# Patient Record
Sex: MALE | URBAN - METROPOLITAN AREA
[De-identification: names, ages, dates, MRNs, and addresses within clinical notes are randomized per-mention and may not be internally consistent; named-entity substitution may affect disease eponyms.]

---

## 2023-03-23 ENCOUNTER — INPATIENT (INPATIENT)
Facility: HOSPITAL | Age: 88
LOS: 7 days | Discharge: NURSING, REHAB OR LONG-TERM CARE | DRG: 56 | End: 2023-03-31
Attending: STUDENT IN AN ORGANIZED HEALTH CARE EDUCATION/TRAINING PROGRAM | Admitting: HOSPITALIST
Payer: MEDICARE

## 2023-03-23 VITALS
SYSTOLIC BLOOD PRESSURE: 115 MMHG | TEMPERATURE: 98 F | RESPIRATION RATE: 16 BRPM | WEIGHT: 169.98 LBS | HEART RATE: 62 BPM | OXYGEN SATURATION: 97 % | HEIGHT: 71 IN | DIASTOLIC BLOOD PRESSURE: 72 MMHG

## 2023-03-23 DIAGNOSIS — R62.7 ADULT FAILURE TO THRIVE: ICD-10-CM

## 2023-03-23 LAB
ALBUMIN SERPL ELPH-MCNC: 3.2 G/DL — LOW (ref 3.3–5)
ALP SERPL-CCNC: 271 U/L — HIGH (ref 40–120)
ALT FLD-CCNC: 7 U/L — LOW (ref 10–45)
AST SERPL-CCNC: 24 U/L — SIGNIFICANT CHANGE UP (ref 10–40)
BASE EXCESS BLDV CALC-SCNC: 15.7 MMOL/L — HIGH (ref -2–3)
BASOPHILS # BLD AUTO: 0.02 K/UL — SIGNIFICANT CHANGE UP (ref 0–0.2)
BASOPHILS NFR BLD AUTO: 0.3 % — SIGNIFICANT CHANGE UP (ref 0–2)
BILIRUB SERPL-MCNC: 1 MG/DL — SIGNIFICANT CHANGE UP (ref 0.2–1.2)
BUN SERPL-MCNC: 13 MG/DL — SIGNIFICANT CHANGE UP (ref 7–23)
CA-I SERPL-SCNC: 1.09 MMOL/L — LOW (ref 1.15–1.33)
CALCIUM SERPL-MCNC: 8.8 MG/DL — SIGNIFICANT CHANGE UP (ref 8.4–10.5)
CHLORIDE BLDV-SCNC: 99 MMOL/L — SIGNIFICANT CHANGE UP (ref 96–108)
CHLORIDE SERPL-SCNC: 96 MMOL/L — SIGNIFICANT CHANGE UP (ref 96–108)
CO2 BLDV-SCNC: 45 MMOL/L — HIGH (ref 22–26)
CO2 SERPL-SCNC: 32 MMOL/L — HIGH (ref 22–31)
CREAT SERPL-MCNC: 1.07 MG/DL — SIGNIFICANT CHANGE UP (ref 0.5–1.3)
EGFR: 67 ML/MIN/1.73M2 — SIGNIFICANT CHANGE UP
EOSINOPHIL # BLD AUTO: 0.02 K/UL — SIGNIFICANT CHANGE UP (ref 0–0.5)
EOSINOPHIL NFR BLD AUTO: 0.3 % — SIGNIFICANT CHANGE UP (ref 0–6)
FLUAV AG NPH QL: SIGNIFICANT CHANGE UP
FLUBV AG NPH QL: SIGNIFICANT CHANGE UP
GAS PNL BLDV: 140 MMOL/L — SIGNIFICANT CHANGE UP (ref 136–145)
GAS PNL BLDV: SIGNIFICANT CHANGE UP
GAS PNL BLDV: SIGNIFICANT CHANGE UP
GLUCOSE BLDV-MCNC: 123 MG/DL — HIGH (ref 70–99)
GLUCOSE SERPL-MCNC: 132 MG/DL — HIGH (ref 70–99)
HCO3 BLDV-SCNC: 43 MMOL/L — HIGH (ref 22–29)
HCT VFR BLD CALC: 38 % — LOW (ref 39–50)
HCT VFR BLDA CALC: 38 % — LOW (ref 39–51)
HGB BLD CALC-MCNC: 12.6 G/DL — SIGNIFICANT CHANGE UP (ref 12.6–17.4)
HGB BLD-MCNC: 12.9 G/DL — LOW (ref 13–17)
IMM GRANULOCYTES NFR BLD AUTO: 0.5 % — SIGNIFICANT CHANGE UP (ref 0–0.9)
LACTATE BLDV-MCNC: 1.7 MMOL/L — SIGNIFICANT CHANGE UP (ref 0.5–2)
LIDOCAIN IGE QN: 12 U/L — SIGNIFICANT CHANGE UP (ref 7–60)
LYMPHOCYTES # BLD AUTO: 1.21 K/UL — SIGNIFICANT CHANGE UP (ref 1–3.3)
LYMPHOCYTES # BLD AUTO: 18.7 % — SIGNIFICANT CHANGE UP (ref 13–44)
MAGNESIUM SERPL-MCNC: 2.4 MG/DL — SIGNIFICANT CHANGE UP (ref 1.6–2.6)
MCHC RBC-ENTMCNC: 31.5 PG — SIGNIFICANT CHANGE UP (ref 27–34)
MCHC RBC-ENTMCNC: 33.9 GM/DL — SIGNIFICANT CHANGE UP (ref 32–36)
MCV RBC AUTO: 92.7 FL — SIGNIFICANT CHANGE UP (ref 80–100)
MONOCYTES # BLD AUTO: 0.36 K/UL — SIGNIFICANT CHANGE UP (ref 0–0.9)
MONOCYTES NFR BLD AUTO: 5.6 % — SIGNIFICANT CHANGE UP (ref 2–14)
NEUTROPHILS # BLD AUTO: 4.82 K/UL — SIGNIFICANT CHANGE UP (ref 1.8–7.4)
NEUTROPHILS NFR BLD AUTO: 74.6 % — SIGNIFICANT CHANGE UP (ref 43–77)
NRBC # BLD: 0 /100 WBCS — SIGNIFICANT CHANGE UP (ref 0–0)
PCO2 BLDV: 63 MMHG — HIGH (ref 42–55)
PH BLDV: 7.44 — HIGH (ref 7.32–7.43)
PHOSPHATE SERPL-MCNC: 2.8 MG/DL — SIGNIFICANT CHANGE UP (ref 2.5–4.5)
PLATELET # BLD AUTO: 255 K/UL — SIGNIFICANT CHANGE UP (ref 150–400)
PO2 BLDV: 18 MMHG — LOW (ref 25–45)
POTASSIUM BLDV-SCNC: 2.8 MMOL/L — CRITICAL LOW (ref 3.5–5.1)
POTASSIUM SERPL-MCNC: 4.3 MMOL/L — SIGNIFICANT CHANGE UP (ref 3.5–5.3)
POTASSIUM SERPL-SCNC: 4.3 MMOL/L — SIGNIFICANT CHANGE UP (ref 3.5–5.3)
PROT SERPL-MCNC: 7.6 G/DL — SIGNIFICANT CHANGE UP (ref 6–8.3)
RBC # BLD: 4.1 M/UL — LOW (ref 4.2–5.8)
RBC # FLD: 13 % — SIGNIFICANT CHANGE UP (ref 10.3–14.5)
RSV RNA NPH QL NAA+NON-PROBE: SIGNIFICANT CHANGE UP
SAO2 % BLDV: 27.4 % — LOW (ref 67–88)
SARS-COV-2 RNA SPEC QL NAA+PROBE: SIGNIFICANT CHANGE UP
SODIUM SERPL-SCNC: 141 MMOL/L — SIGNIFICANT CHANGE UP (ref 135–145)
WBC # BLD: 6.46 K/UL — SIGNIFICANT CHANGE UP (ref 3.8–10.5)
WBC # FLD AUTO: 6.46 K/UL — SIGNIFICANT CHANGE UP (ref 3.8–10.5)

## 2023-03-23 PROCEDURE — 72125 CT NECK SPINE W/O DYE: CPT | Mod: 26,MA

## 2023-03-23 PROCEDURE — 71045 X-RAY EXAM CHEST 1 VIEW: CPT | Mod: 26

## 2023-03-23 PROCEDURE — 70450 CT HEAD/BRAIN W/O DYE: CPT | Mod: 26,MA

## 2023-03-23 PROCEDURE — 99285 EMERGENCY DEPT VISIT HI MDM: CPT

## 2023-03-23 RX ORDER — SODIUM CHLORIDE 9 MG/ML
1000 INJECTION, SOLUTION INTRAVENOUS ONCE
Refills: 0 | Status: COMPLETED | OUTPATIENT
Start: 2023-03-23 | End: 2023-03-23

## 2023-03-23 RX ADMIN — SODIUM CHLORIDE 1000 MILLILITER(S): 9 INJECTION, SOLUTION INTRAVENOUS at 19:47

## 2023-03-23 NOTE — ED PROCEDURE NOTE - ATTENDING CONTRIBUTION TO CARE
I, EM Attending, Cheikh Colón was present for and supervised the critical portions of the procedure performed by the Resident Physician or SLOANE.

## 2023-03-23 NOTE — ED PROVIDER NOTE - ATTENDING CONTRIBUTION TO CARE
Cheikh Colón MD:  I personally saw the patient and performed a substantive portion of the visit including all aspects of the medical decision making.    MDM: 87-year-old male who presents with generalized weakness, failure to thrive, decreased p.o. for the last 2 weeks.  Associated with weight loss, and fluctuating BPs that have often gone below.  Of note patient had a fall 1 month ago, but denies any symptoms from this.    On examination, patient with stable vitals mildly cachectic, (+) dry mucous membranes. Cardiac examination RRR, lungs CTAB, abdomen soft and nontender, neurovascularly intact in all 4 extremities.    Will obtain labs to evaluate for hematologic disorder, metabolic derangements, hepatic and renal function, and screen for infection.  Will obtain CT Head to evaluate for acute intracranial pathology.  Will obtain CT C-spine to evaluate for acute traumatic cervical spine injury.  Will obtain chest x-ray to evaluate for acute cardiopulmonary pathology.  Will give IV fluids.  Since patient has been having difficulty ambulation, and failure to thrive and dry mucosal membranes and inability to tolerate p.o., he will likely require admission.    My independent interpretation of the EKG shows:  Sinus bradycardia with rate of 54 bpm, , , prolonged QTc of 629, no ST elevations, (+) for mild ST depressions in multiple leads including inferior lateral.  Will obtain troponin to evaluate for ACS, but pt asymptomatic from this standpoint.     Differential includes but is not limited to: See above    Patient with new problems requiring additional work-up and treatment, following orders: see above  Discussed case with: Admitting physician  Obtained and reviewed external records: N/A  Additional history obtained from: Daughter at bedside  Chronic conditions and social determinants of health affecting care: See above  Consideration of admission

## 2023-03-23 NOTE — ED PROVIDER NOTE - RAPID ASSESSMENT
Dr. Iniguez Note: h/o from daughter, pt with weight loss, low BPs at home, not eating, falling at home, has head and neck pain, no fever, respiratory sxs.  Patient was rapidly assessed via a telemedicine and/or role of Quick Triage Doctor; a limited history, physical exam and assessment was performed. The patient will be seen and further evaluated in the main emergency department. The remainder of care and evaluation will be conducted by the primary emergency medicine team. Receiving team will follow up on labs, imaging and serially reassess patient as indicated. All further decisions regarding patient care, evaluation and disposition are at the discretion of the receiving primary emergency department team.

## 2023-03-23 NOTE — ED PROVIDER NOTE - OBJECTIVE STATEMENT
87-year-old male, no past medical history aside from difficulty hearing, presenting with generalized weakness ongoing for 2 weeks.  Failure to thrive ongoing for the past 4 weeks, with minimal p.o. intake.  No other active symptoms per daughter.  ongoing weakness for the past 2 weeks, progressing to the point that he has difficulty completing his daily functional task.  No allergies to meds.  No regular medications.

## 2023-03-23 NOTE — ED ADULT TRIAGE NOTE - CHIEF COMPLAINT QUOTE
fluctuating BP and weakness x 1 week. denies HA, vision changes, fever, chills. lives at home with daughter. reports falls at home.

## 2023-03-23 NOTE — ED PROVIDER NOTE - NS ED ROS FT
GENERAL: + FT, no fever  EYES: no eye pain  HEENT: no neck pain  CARDIAC: no chest pain  PULMONARY: no SOB  GI: no abdominal pain  : no dysuria  SKIN: no rashes  NEURO: no headache  MSK: no new joint pain

## 2023-03-23 NOTE — ED PROVIDER NOTE - PROGRESS NOTE DETAILS
Marcela López, PGY1, MD: pt signed out to me pending CT head and admission for FTT, CT head neg for ICH, discussed plan and findings with patient and family at bedside, all questions answered, they agree with admission. Cheikh Colón MD: labs and imaging reviewed. The patient will need to be admitted to the hospital for continued evaluation and management.  Discussed with the accepting physician regarding the initial presentation, diagnostic studies, treatments given in the ED, and current plan of care.   The patient was accepted by and endorsed to the medicine team.

## 2023-03-23 NOTE — ED ADULT NURSE NOTE - OBJECTIVE STATEMENT
86 yo male [place] no PMH, A&Ox3, presents to ED c/o generalized weakness.  Pts daughter reports over last month he has had decreased PO intake and weight loss , after fall and being more weak in last 2 weeks.   Pt denies any complaints.   Pt lives with daughter.  Breathing even and unlabored, abdomen soft nontender, no pedal edema. Pt denies chest pain, palpitations, shortness of breath, headache, visual disturbances, numbness/tingling, fever, chills, diaphoresis,  nausea, vomiting, constipation, diarrhea, or urinary symptoms.

## 2023-03-23 NOTE — ED PROVIDER NOTE - CLINICAL SUMMARY MEDICAL DECISION MAKING FREE TEXT BOX
Elderly male, presenting with failure to thrive.  Vital signs unremarkable.  Exam nonfocal.  Work-up for generalized weakness and failure to thrive.  Rule out metabolic derangements, rule out occult infections.  Head CT to assess for evidence subdural hematoma.   no active symptoms to suggest acute cardiac injury in a patient with chronic failure to thrive.  Will obtain CMP, CBC, VBG, head CT and cervical spine CT.  Likely admission for failure to thrive in elderly male.

## 2023-03-24 DIAGNOSIS — R62.7 ADULT FAILURE TO THRIVE: ICD-10-CM

## 2023-03-24 DIAGNOSIS — E87.6 HYPOKALEMIA: ICD-10-CM

## 2023-03-24 DIAGNOSIS — Z02.9 ENCOUNTER FOR ADMINISTRATIVE EXAMINATIONS, UNSPECIFIED: ICD-10-CM

## 2023-03-24 LAB
A1C WITH ESTIMATED AVERAGE GLUCOSE RESULT: 5.6 % — SIGNIFICANT CHANGE UP (ref 4–5.6)
ALBUMIN SERPL ELPH-MCNC: 3.2 G/DL — LOW (ref 3.3–5)
ALP SERPL-CCNC: 258 U/L — HIGH (ref 40–120)
ALT FLD-CCNC: 6 U/L — LOW (ref 10–45)
ANION GAP SERPL CALC-SCNC: 10 MMOL/L — SIGNIFICANT CHANGE UP (ref 5–17)
APTT BLD: 33.3 SEC — SIGNIFICANT CHANGE UP (ref 27.5–35.5)
AST SERPL-CCNC: 9 U/L — LOW (ref 10–40)
BASOPHILS # BLD AUTO: 0.02 K/UL — SIGNIFICANT CHANGE UP (ref 0–0.2)
BASOPHILS NFR BLD AUTO: 0.4 % — SIGNIFICANT CHANGE UP (ref 0–2)
BILIRUB SERPL-MCNC: 1 MG/DL — SIGNIFICANT CHANGE UP (ref 0.2–1.2)
BUN SERPL-MCNC: 13 MG/DL — SIGNIFICANT CHANGE UP (ref 7–23)
CALCIUM SERPL-MCNC: 8.2 MG/DL — LOW (ref 8.4–10.5)
CHLORIDE SERPL-SCNC: 98 MMOL/L — SIGNIFICANT CHANGE UP (ref 96–108)
CHOLEST SERPL-MCNC: 119 MG/DL — SIGNIFICANT CHANGE UP
CO2 SERPL-SCNC: 35 MMOL/L — HIGH (ref 22–31)
CREAT SERPL-MCNC: 1.06 MG/DL — SIGNIFICANT CHANGE UP (ref 0.5–1.3)
CRP SERPL-MCNC: 29 MG/L — HIGH (ref 0–4)
EGFR: 68 ML/MIN/1.73M2 — SIGNIFICANT CHANGE UP
EOSINOPHIL # BLD AUTO: 0.05 K/UL — SIGNIFICANT CHANGE UP (ref 0–0.5)
EOSINOPHIL NFR BLD AUTO: 0.9 % — SIGNIFICANT CHANGE UP (ref 0–6)
ERYTHROCYTE [SEDIMENTATION RATE] IN BLOOD: 71 MM/HR — HIGH (ref 0–20)
ESTIMATED AVERAGE GLUCOSE: 114 MG/DL — SIGNIFICANT CHANGE UP (ref 68–114)
FOLATE SERPL-MCNC: 3.1 NG/ML — LOW
GLUCOSE SERPL-MCNC: 96 MG/DL — SIGNIFICANT CHANGE UP (ref 70–99)
HCT VFR BLD CALC: 34.8 % — LOW (ref 39–50)
HDLC SERPL-MCNC: 31 MG/DL — LOW
HGB BLD-MCNC: 11.7 G/DL — LOW (ref 13–17)
IMM GRANULOCYTES NFR BLD AUTO: 0.5 % — SIGNIFICANT CHANGE UP (ref 0–0.9)
INR BLD: 1.17 RATIO — HIGH (ref 0.88–1.16)
LIPID PNL WITH DIRECT LDL SERPL: 75 MG/DL — SIGNIFICANT CHANGE UP
LYMPHOCYTES # BLD AUTO: 0.95 K/UL — LOW (ref 1–3.3)
LYMPHOCYTES # BLD AUTO: 16.6 % — SIGNIFICANT CHANGE UP (ref 13–44)
MAGNESIUM SERPL-MCNC: 2.2 MG/DL — SIGNIFICANT CHANGE UP (ref 1.6–2.6)
MCHC RBC-ENTMCNC: 30.9 PG — SIGNIFICANT CHANGE UP (ref 27–34)
MCHC RBC-ENTMCNC: 33.6 GM/DL — SIGNIFICANT CHANGE UP (ref 32–36)
MCV RBC AUTO: 91.8 FL — SIGNIFICANT CHANGE UP (ref 80–100)
MONOCYTES # BLD AUTO: 0.46 K/UL — SIGNIFICANT CHANGE UP (ref 0–0.9)
MONOCYTES NFR BLD AUTO: 8.1 % — SIGNIFICANT CHANGE UP (ref 2–14)
NEUTROPHILS # BLD AUTO: 4.2 K/UL — SIGNIFICANT CHANGE UP (ref 1.8–7.4)
NEUTROPHILS NFR BLD AUTO: 73.5 % — SIGNIFICANT CHANGE UP (ref 43–77)
NON HDL CHOLESTEROL: 88 MG/DL — SIGNIFICANT CHANGE UP
NRBC # BLD: 0 /100 WBCS — SIGNIFICANT CHANGE UP (ref 0–0)
PHOSPHATE SERPL-MCNC: 2.3 MG/DL — LOW (ref 2.5–4.5)
PLATELET # BLD AUTO: 190 K/UL — SIGNIFICANT CHANGE UP (ref 150–400)
POTASSIUM SERPL-MCNC: 2.3 MMOL/L — CRITICAL LOW (ref 3.5–5.3)
POTASSIUM SERPL-SCNC: 2.3 MMOL/L — CRITICAL LOW (ref 3.5–5.3)
PROT SERPL-MCNC: 6.4 G/DL — SIGNIFICANT CHANGE UP (ref 6–8.3)
PROTHROM AB SERPL-ACNC: 13.6 SEC — HIGH (ref 10.5–13.4)
RBC # BLD: 3.79 M/UL — LOW (ref 4.2–5.8)
RBC # FLD: 12.9 % — SIGNIFICANT CHANGE UP (ref 10.3–14.5)
SODIUM SERPL-SCNC: 143 MMOL/L — SIGNIFICANT CHANGE UP (ref 135–145)
T PALLIDUM AB TITR SER: NEGATIVE — SIGNIFICANT CHANGE UP
TRIGL SERPL-MCNC: 62 MG/DL — SIGNIFICANT CHANGE UP
TROPONIN T, HIGH SENSITIVITY RESULT: 36 NG/L — SIGNIFICANT CHANGE UP (ref 0–51)
VIT B12 SERPL-MCNC: 221 PG/ML — LOW (ref 232–1245)
WBC # BLD: 5.71 K/UL — SIGNIFICANT CHANGE UP (ref 3.8–10.5)
WBC # FLD AUTO: 5.71 K/UL — SIGNIFICANT CHANGE UP (ref 3.8–10.5)

## 2023-03-24 PROCEDURE — 78227 HEPATOBIL SYST IMAGE W/DRUG: CPT | Mod: 26

## 2023-03-24 PROCEDURE — 12345: CPT | Mod: NC

## 2023-03-24 PROCEDURE — 93306 TTE W/DOPPLER COMPLETE: CPT | Mod: 26

## 2023-03-24 PROCEDURE — 74177 CT ABD & PELVIS W/CONTRAST: CPT | Mod: 26

## 2023-03-24 PROCEDURE — 76705 ECHO EXAM OF ABDOMEN: CPT | Mod: 26

## 2023-03-24 PROCEDURE — 71260 CT THORAX DX C+: CPT | Mod: 26

## 2023-03-24 PROCEDURE — 99223 1ST HOSP IP/OBS HIGH 75: CPT

## 2023-03-24 RX ORDER — PREGABALIN 225 MG/1
1000 CAPSULE ORAL DAILY
Refills: 0 | Status: COMPLETED | OUTPATIENT
Start: 2023-03-24 | End: 2023-03-31

## 2023-03-24 RX ORDER — POTASSIUM CHLORIDE 20 MEQ
40 PACKET (EA) ORAL EVERY 4 HOURS
Refills: 0 | Status: COMPLETED | OUTPATIENT
Start: 2023-03-24 | End: 2023-03-24

## 2023-03-24 RX ORDER — HEPARIN SODIUM 5000 [USP'U]/ML
5000 INJECTION INTRAVENOUS; SUBCUTANEOUS EVERY 8 HOURS
Refills: 0 | Status: DISCONTINUED | OUTPATIENT
Start: 2023-03-24 | End: 2023-03-31

## 2023-03-24 RX ORDER — ONDANSETRON 8 MG/1
4 TABLET, FILM COATED ORAL EVERY 8 HOURS
Refills: 0 | Status: DISCONTINUED | OUTPATIENT
Start: 2023-03-24 | End: 2023-03-31

## 2023-03-24 RX ORDER — POTASSIUM CHLORIDE 20 MEQ
10 PACKET (EA) ORAL
Refills: 0 | Status: COMPLETED | OUTPATIENT
Start: 2023-03-24 | End: 2023-03-24

## 2023-03-24 RX ORDER — ACETAMINOPHEN 500 MG
650 TABLET ORAL EVERY 6 HOURS
Refills: 0 | Status: DISCONTINUED | OUTPATIENT
Start: 2023-03-24 | End: 2023-03-31

## 2023-03-24 RX ORDER — LANOLIN ALCOHOL/MO/W.PET/CERES
3 CREAM (GRAM) TOPICAL AT BEDTIME
Refills: 0 | Status: DISCONTINUED | OUTPATIENT
Start: 2023-03-24 | End: 2023-03-31

## 2023-03-24 RX ORDER — SODIUM CHLORIDE 9 MG/ML
1000 INJECTION INTRAMUSCULAR; INTRAVENOUS; SUBCUTANEOUS
Refills: 0 | Status: DISCONTINUED | OUTPATIENT
Start: 2023-03-24 | End: 2023-03-26

## 2023-03-24 RX ORDER — FOLIC ACID 0.8 MG
1 TABLET ORAL DAILY
Refills: 0 | Status: DISCONTINUED | OUTPATIENT
Start: 2023-03-24 | End: 2023-03-31

## 2023-03-24 RX ADMIN — Medication 40 MILLIEQUIVALENT(S): at 10:41

## 2023-03-24 RX ADMIN — Medication 100 MILLIEQUIVALENT(S): at 13:19

## 2023-03-24 RX ADMIN — Medication 63.75 MILLIMOLE(S): at 10:41

## 2023-03-24 RX ADMIN — HEPARIN SODIUM 5000 UNIT(S): 5000 INJECTION INTRAVENOUS; SUBCUTANEOUS at 05:08

## 2023-03-24 RX ADMIN — HEPARIN SODIUM 5000 UNIT(S): 5000 INJECTION INTRAVENOUS; SUBCUTANEOUS at 13:20

## 2023-03-24 RX ADMIN — Medication 100 MILLIEQUIVALENT(S): at 11:29

## 2023-03-24 RX ADMIN — SODIUM CHLORIDE 75 MILLILITER(S): 9 INJECTION INTRAMUSCULAR; INTRAVENOUS; SUBCUTANEOUS at 10:41

## 2023-03-24 RX ADMIN — Medication 40 MILLIEQUIVALENT(S): at 13:19

## 2023-03-24 RX ADMIN — Medication 40 MILLIEQUIVALENT(S): at 17:53

## 2023-03-24 RX ADMIN — Medication 100 MILLIEQUIVALENT(S): at 10:40

## 2023-03-24 RX ADMIN — SODIUM CHLORIDE 75 MILLILITER(S): 9 INJECTION INTRAMUSCULAR; INTRAVENOUS; SUBCUTANEOUS at 13:20

## 2023-03-24 RX ADMIN — HEPARIN SODIUM 5000 UNIT(S): 5000 INJECTION INTRAVENOUS; SUBCUTANEOUS at 22:00

## 2023-03-24 RX ADMIN — SODIUM CHLORIDE 75 MILLILITER(S): 9 INJECTION INTRAMUSCULAR; INTRAVENOUS; SUBCUTANEOUS at 05:08

## 2023-03-24 NOTE — PROGRESS NOTE ADULT - PROBLEM SELECTOR PLAN 4
CTAP w/ acute cholecystitis on imaging, however pt w/o symptoms, neg kirby's sign  - surgery eval  - HIDA scan ordered

## 2023-03-24 NOTE — OCCUPATIONAL THERAPY INITIAL EVALUATION ADULT - PERTINENT HX OF CURRENT PROBLEM, REHAB EVAL
86 yo m w no pmh, p/w generalized fatigue/weakness/malaise for the last month. patient has been having poor po intake with weight loss during this time. now so weak, that he feels as though he is unable to perform adls; in addition, he has incurred multiple falls over this time period as well. family and patient grew concerned so present to [facility] er for further evaluation.   CT HEAD: BRAIN 3/23/23:No acute hemorrhage, mass or midline shift.  CERVICAL SPINE3/23/23 :No fracture or traumatic subluxation. Chronic degenerative changes as   above. Consider MRI as clinically warranted.  XRAY CHEST: Clear lungs. 86 yo m w no pmh, p/w generalized fatigue/weakness/malaise for the last month. patient has been having poor po intake with weight loss during this time. now so weak, that he feels as though he is unable to perform adls; in addition, he has incurred multiple falls over this time period as well. family and patient grew concerned so present to [facility] er for further evaluation.   CT HEAD: BRAIN 3/23/23:No acute hemorrhage, mass or midline shift.  CERVICAL SPINE3/23/23 :No fracture or traumatic subluxation. Chronic degenerative changes as   above. Consider MRI as clinically warranted.  CT Chest 3/24/2023: No evidence of malignancy. Distended gallbladder with irregular wall thickening and pericholecystic   inflammation consistent with acute cholecystitis.      XRAY CHEST: Clear lungs.

## 2023-03-24 NOTE — PHYSICAL THERAPY INITIAL EVALUATION ADULT - PERTINENT HX OF CURRENT PROBLEM, REHAB EVAL
86 y/o M with no PMH now p/w generalized fatigue/weakness/malaise for the last month. patient has been having poor po intake with weight loss during this time. now so weak, that he feels as though he is unable to perform adls; in addition, he has incurred multiple falls over this time period as well. family and patient grew concerned so present to [facility] ED for further evaluation. CHEST XRAY: clear. CT HEAD (-). CT CERVICAL SPINE: no acute findings. CT CHEST/ABDOMEN/PELVIS: no evidence of malignancy. Distended gallbladder with irregular wall thickening and pericholecystic inflammation consistent with acute cholecystitis.

## 2023-03-24 NOTE — H&P ADULT - NSHPPHYSICALEXAM_GEN_ALL_CORE
T(C): 37 (03-24-23 @ 00:14), Max: 37.1 (03-23-23 @ 23:48)  HR: 62 (03-24-23 @ 00:14) (55 - 62)  BP: 130/68 (03-24-23 @ 00:14) (115/72 - 149/77)  RR: 18 (03-24-23 @ 00:14) (16 - 18)  SpO2: 95% (03-24-23 @ 00:14) (94% - 98%)  GENERAL: NAD, lying in bed comfortably  EYES: EOMI, PERRLA; conjunctiva and sclera clear  ENMT: Moist oral mucosa, no pharyngeal injection or exudates  NECK: Supple, no palpable masses; no JVD  RESPIRATORY: Normal respiratory effort; lungs are clear to auscultation bilaterally  CARDIOVASCULAR: Regular rate and rhythm, normal S1 and S2, no murmur/rub/gallop; No lower extremity edema; Peripheral pulses are 2+ bilaterally  ABDOMEN: Nontender to palpation, normoactive bowel sounds, no rebound/guarding   MUSCULOSKELETAL:   no joint swelling or tenderness to palpation  PSYCH: A+O to person, place, and time; affect appropriate  NEUROLOGY: CN 2-12 are intact and symmetric; no gross motor or sensory deficits   SKIN: No rashes; no palpable lesions

## 2023-03-24 NOTE — H&P ADULT - PROBLEM SELECTOR PLAN 1
neuroimaging shows chronic microangiopathic ischemic changes  Monitor mental status with frequent neurochecks  follow up inflammatory markers, tfts, urine studies, b12+folate+rpr, pan imaging  maintain fall, delirium, seizure, aspiration precautions; keep head end of bed elevated  nutrition consult in am  pt/ot eval + sw/cm consult for disposition

## 2023-03-24 NOTE — H&P ADULT - HISTORY OF PRESENT ILLNESS
86 yo m w no pmh, p/w generalized fatigue/weakness/malaise for the last month. patient has been having poor po intake with weight loss during this time. now so weak, that he feels as though he is unable to perform adls; in addition, he has incurred multiple falls over this time period as well. family and patient grew concerned so present to [facility] er for further evaluation.

## 2023-03-24 NOTE — PATIENT PROFILE ADULT - FALL HARM RISK - HARM RISK INTERVENTIONS

## 2023-03-24 NOTE — PROGRESS NOTE ADULT - PROBLEM SELECTOR PLAN 1
CTH shows chronic microangiopathic ischemic changes  vitamin b12 and folate deficiency noted on labs  pan scan - CT c/a/p neg for gross malignancy  - vit b12 1000 mcg sc qd while inpt for faster absorption, can switch to po in dc. Folate 1mg qd started  - r/o pernicious anemia - check intrinsic factor ab  - nutrition consult  - regular diet - added ensure tid CTH shows chronic microangiopathic ischemic changes  vitamin b12 and folate deficiency noted on labs  pan scan - CT c/a/p neg for gross malignancy  no known hx of diarrhea/blood loss  - vit b12 1000 mcg sc qd while inpt for faster absorption, can switch to po in dc. Folate 1mg qd started  - r/o pernicious anemia - check intrinsic factor ab  - nutrition consult  - regular diet - added ensure tid

## 2023-03-24 NOTE — H&P ADULT - NSHPREVIEWOFSYSTEMS_GEN_ALL_CORE
CONSTITUTIONAL: No fever. +weakness/fatigue/malaise, poor po intake, weight loss  ENMT:  No sinus or throat pain  RESPIRATORY: No cough, wheezing, chills or hemoptysis; No shortness of breath  CARDIOVASCULAR: No chest pain, palpitations, dizziness, or leg swelling  GASTROINTESTINAL: No abdominal or epigastric pain. No nausea, vomiting, or hematemesis; No diarrhea or constipation. No melena or hematochezia.  GENITOURINARY: No dysuria or incontinence  NEUROLOGICAL: No headaches, memory loss, loss of strength, numbness, or tremors  SKIN: No rashes,  No hives or eczema  ENDOCRINE: No heat or cold intolerance; No hair loss  MUSCULOSKELETAL: No joint pain or swelling; No muscle, back, or extremity pain  PSYCHIATRIC: No depression, anxiety, mood swings, or difficulty sleeping  HEME/LYMPH: No easy bruising, or bleeding gums

## 2023-03-24 NOTE — OCCUPATIONAL THERAPY INITIAL EVALUATION ADULT - DIAGNOSIS, OT EVAL
pt presents with decreased strength, hearing, postural control, and balance limiting their ability to engage in ADLs and functional tasks.

## 2023-03-24 NOTE — H&P ADULT - ASSESSMENT
86 yo m w no pmh, p/w generalized fatigue/weakness of unclear etiology, c/f ftt, admitted to medicine for further mgmt

## 2023-03-24 NOTE — OCCUPATIONAL THERAPY INITIAL EVALUATION ADULT - NS ASR FOLLOW COMMAND OT EVAL
pt very [place], requiring commands to be written down on paper./100% of the time/able to follow single-step instructions

## 2023-03-24 NOTE — PROGRESS NOTE ADULT - SUBJECTIVE AND OBJECTIVE BOX
Sarah Franklin MD  Division of Hospital Medicine  Available via MS Teams  If no response/off hours [phone]    Patient is a 87y old  Male who presents with a chief complaint of generalized fatigue/weakness (24 Mar 2023 03:02)        SUBJECTIVE / OVERNIGHT EVENTS:  hard of hearing, but denies complaints, no n/v/f/chills, cp, sob      I&O's Summary    23 Mar 2023 07:01  -  24 Mar 2023 07:00  --------------------------------------------------------  IN: 0 mL / OUT: 150 mL / NET: -150 mL      Vital Signs Last 24 Hrs  T(C): 36.9 (24 Mar 2023 11:27), Max: 37.1 (23 Mar 2023 23:48)  T(F): 98.5 (24 Mar 2023 11:27), Max: 98.7 (23 Mar 2023 23:48)  HR: 50 (24 Mar 2023 11:27) (50 - 62)  BP: 119/65 (24 Mar 2023 11:27) (119/65 - 149/77)  BP(mean): 89 (23 Mar 2023 23:48) (89 - 89)  RR: 18 (24 Mar 2023 11:27) (16 - 18)  SpO2: 95% (24 Mar 2023 11:27) (94% - 98%)    Parameters below as of 24 Mar 2023 11:27  Patient On (Oxygen Delivery Method): room air        PHYSICAL EXAM:  GENERAL:  Well appearing, tall m in NAD, hard of hearing  HEAD:  NCAT  EYES: conjunctiva clear  NECK: Supple, No JVD  CHEST/LUNG: CTA B/L. No w/r/r.  HEART: Reg rate. Normal S1, S2. No m/r/g.   ABDOMEN: Soft, no RUQ ttp, negative kirby's  EXTREMITIES:  2+ Peripheral Pulses, No clubbing, cyanosis  PSYCH: Appropriate affect  SKIN: No rashes or lesions    LABS:                        11.7   5.71  )-----------( 190      ( 24 Mar 2023 07:02 )             34.8     03-24    143  |  98  |  13  ----------------------------<  96  2.3<LL>   |  35<H>  |  1.06    Ca    8.2<L>      24 Mar 2023 07:02  Phos  2.3     03-24  Mg     2.2     03-24    TPro  6.4  /  Alb  3.2<L>  /  TBili  1.0  /  DBili  x   /  AST  9<L>  /  ALT  6<L>  /  AlkPhos  258<H>  03-24    PT/INR - ( 24 Mar 2023 07:02 )   PT: 13.6 sec;   INR: 1.17 ratio         PTT - ( 24 Mar 2023 07:02 )  PTT:33.3 sec              CAPILLARY BLOOD GLUCOSE        MEDICATIONS  (STANDING):  cyanocobalamin Injectable 1000 MICROGram(s) SubCutaneous daily  folic acid 1 milliGRAM(s) Oral daily  heparin   Injectable 5000 Unit(s) SubCutaneous every 8 hours  potassium chloride    Tablet ER 40 milliEquivalent(s) Oral every 4 hours  potassium chloride  10 mEq/100 mL IVPB 10 milliEquivalent(s) IV Intermittent every 1 hour  sodium chloride 0.9%. 1000 milliLiter(s) (75 mL/Hr) IV Continuous <Continuous>    MEDICATIONS  (PRN):  acetaminophen     Tablet .. 650 milliGRAM(s) Oral every 6 hours PRN Temp greater or equal to 38C (100.4F), Mild Pain (1 - 3)  aluminum hydroxide/magnesium hydroxide/simethicone Suspension 30 milliLiter(s) Oral every 4 hours PRN Dyspepsia  melatonin 3 milliGRAM(s) Oral at bedtime PRN Insomnia  ondansetron Injectable 4 milliGRAM(s) IV Push every 8 hours PRN Nausea and/or Vomiting

## 2023-03-24 NOTE — PROVIDER CONTACT NOTE (OTHER) - ASSESSMENT
Pt A&O3, disoriented to time at times. VSS, denies cp/SOB/HA. Bladder scan ordered- 918 ml. Pt voided 150 ml. Bladder scan redone- 700's. Notified provider, ordered to straight cath. Attempted straight cath x2, no urine return

## 2023-03-24 NOTE — PHYSICAL THERAPY INITIAL EVALUATION ADULT - NSPTDISCHREC_GEN_A_CORE
Subacute Rehab to improve functional mobility and independence. If pt goes home, recommend Home PT, caregiver assist with all OOB mobility/ADLs, RW, 3:1 commode, and transport w/c for safe house entry to encourage energy conservation and reduce fall risk./Sub-acute Rehab

## 2023-03-24 NOTE — PHYSICAL THERAPY INITIAL EVALUATION ADULT - ADDITIONAL COMMENTS
Pt lives in an private home, alone there are 20 steps to enter. Pt performed ADL/IADLs independently. Ambulates with no AD. Daughter will be staying with pt temporarily.

## 2023-03-24 NOTE — OCCUPATIONAL THERAPY INITIAL EVALUATION ADULT - LIVES WITH, PROFILE
as per pt, pt lives in a house with 20 steps to bedroom. pt reported living with sister, however sister recently passed. pt is remaining with daughter for short period of time

## 2023-03-25 DIAGNOSIS — R82.998 OTHER ABNORMAL FINDINGS IN URINE: ICD-10-CM

## 2023-03-25 LAB
ANION GAP SERPL CALC-SCNC: 9 MMOL/L — SIGNIFICANT CHANGE UP (ref 5–17)
APPEARANCE UR: CLEAR — SIGNIFICANT CHANGE UP
BACTERIA # UR AUTO: NEGATIVE — SIGNIFICANT CHANGE UP
BILIRUB UR-MCNC: ABNORMAL
BUN SERPL-MCNC: 13 MG/DL — SIGNIFICANT CHANGE UP (ref 7–23)
CALCIUM SERPL-MCNC: 8.3 MG/DL — LOW (ref 8.4–10.5)
CHLORIDE SERPL-SCNC: 104 MMOL/L — SIGNIFICANT CHANGE UP (ref 96–108)
CO2 SERPL-SCNC: 32 MMOL/L — HIGH (ref 22–31)
COLOR SPEC: ABNORMAL
CREAT SERPL-MCNC: 1.04 MG/DL — SIGNIFICANT CHANGE UP (ref 0.5–1.3)
DIFF PNL FLD: ABNORMAL
EGFR: 69 ML/MIN/1.73M2 — SIGNIFICANT CHANGE UP
EPI CELLS # UR: 2 /HPF — SIGNIFICANT CHANGE UP
GLUCOSE SERPL-MCNC: 125 MG/DL — HIGH (ref 70–99)
GLUCOSE UR QL: NEGATIVE — SIGNIFICANT CHANGE UP
HCT VFR BLD CALC: 33.6 % — LOW (ref 39–50)
HGB BLD-MCNC: 11.6 G/DL — LOW (ref 13–17)
HYALINE CASTS # UR AUTO: 7 /LPF — HIGH (ref 0–2)
KETONES UR-MCNC: SIGNIFICANT CHANGE UP
LEUKOCYTE ESTERASE UR-ACNC: ABNORMAL
MAGNESIUM SERPL-MCNC: 2.1 MG/DL — SIGNIFICANT CHANGE UP (ref 1.6–2.6)
MCHC RBC-ENTMCNC: 31.6 PG — SIGNIFICANT CHANGE UP (ref 27–34)
MCHC RBC-ENTMCNC: 34.5 GM/DL — SIGNIFICANT CHANGE UP (ref 32–36)
MCV RBC AUTO: 91.6 FL — SIGNIFICANT CHANGE UP (ref 80–100)
NITRITE UR-MCNC: NEGATIVE — SIGNIFICANT CHANGE UP
NRBC # BLD: 0 /100 WBCS — SIGNIFICANT CHANGE UP (ref 0–0)
PH UR: 6 — SIGNIFICANT CHANGE UP (ref 5–8)
PHOSPHATE SERPL-MCNC: 2.3 MG/DL — LOW (ref 2.5–4.5)
PLATELET # BLD AUTO: 223 K/UL — SIGNIFICANT CHANGE UP (ref 150–400)
POTASSIUM SERPL-MCNC: 3 MMOL/L — LOW (ref 3.5–5.3)
POTASSIUM SERPL-SCNC: 3 MMOL/L — LOW (ref 3.5–5.3)
PROT UR-MCNC: ABNORMAL
RBC # BLD: 3.67 M/UL — LOW (ref 4.2–5.8)
RBC # FLD: 13.1 % — SIGNIFICANT CHANGE UP (ref 10.3–14.5)
RBC CASTS # UR COMP ASSIST: 21 /HPF — HIGH (ref 0–4)
SODIUM SERPL-SCNC: 145 MMOL/L — SIGNIFICANT CHANGE UP (ref 135–145)
SP GR SPEC: 1.05 — HIGH (ref 1.01–1.02)
UROBILINOGEN FLD QL: ABNORMAL
WBC # BLD: 7.63 K/UL — SIGNIFICANT CHANGE UP (ref 3.8–10.5)
WBC # FLD AUTO: 7.63 K/UL — SIGNIFICANT CHANGE UP (ref 3.8–10.5)
WBC UR QL: 18 /HPF — HIGH (ref 0–5)

## 2023-03-25 PROCEDURE — 99232 SBSQ HOSP IP/OBS MODERATE 35: CPT

## 2023-03-25 RX ORDER — SODIUM CHLORIDE 9 MG/ML
1000 INJECTION INTRAMUSCULAR; INTRAVENOUS; SUBCUTANEOUS
Refills: 0 | Status: DISCONTINUED | OUTPATIENT
Start: 2023-03-25 | End: 2023-03-26

## 2023-03-25 RX ORDER — POTASSIUM CHLORIDE 20 MEQ
40 PACKET (EA) ORAL EVERY 4 HOURS
Refills: 0 | Status: COMPLETED | OUTPATIENT
Start: 2023-03-25 | End: 2023-03-25

## 2023-03-25 RX ADMIN — Medication 1 MILLIGRAM(S): at 11:39

## 2023-03-25 RX ADMIN — SODIUM CHLORIDE 60 MILLILITER(S): 9 INJECTION INTRAMUSCULAR; INTRAVENOUS; SUBCUTANEOUS at 22:11

## 2023-03-25 RX ADMIN — HEPARIN SODIUM 5000 UNIT(S): 5000 INJECTION INTRAVENOUS; SUBCUTANEOUS at 05:10

## 2023-03-25 RX ADMIN — HEPARIN SODIUM 5000 UNIT(S): 5000 INJECTION INTRAVENOUS; SUBCUTANEOUS at 21:49

## 2023-03-25 RX ADMIN — HEPARIN SODIUM 5000 UNIT(S): 5000 INJECTION INTRAVENOUS; SUBCUTANEOUS at 13:29

## 2023-03-25 RX ADMIN — Medication 40 MILLIEQUIVALENT(S): at 21:48

## 2023-03-25 RX ADMIN — PREGABALIN 1000 MICROGRAM(S): 225 CAPSULE ORAL at 11:39

## 2023-03-25 NOTE — PROGRESS NOTE ADULT - SUBJECTIVE AND OBJECTIVE BOX
[facility] Division of Hospital Medicine  Marcia Mckoy MD  Pager (M-F, 8A-5P): [phone], MS Teams PREFERRED  Other Times:  [phone]      SUBJECTIVE / OVERNIGHT EVENTS: Seen and examined at bedside. He appears well. NAD.    MEDICATIONS  (STANDING):  cyanocobalamin Injectable 1000 MICROGram(s) SubCutaneous daily  folic acid 1 milliGRAM(s) Oral daily  heparin   Injectable 5000 Unit(s) SubCutaneous every 8 hours  sodium chloride 0.9%. 1000 milliLiter(s) (75 mL/Hr) IV Continuous <Continuous>    MEDICATIONS  (PRN):  acetaminophen     Tablet .. 650 milliGRAM(s) Oral every 6 hours PRN Temp greater or equal to 38C (100.4F), Mild Pain (1 - 3)  aluminum hydroxide/magnesium hydroxide/simethicone Suspension 30 milliLiter(s) Oral every 4 hours PRN Dyspepsia  melatonin 3 milliGRAM(s) Oral at bedtime PRN Insomnia  ondansetron Injectable 4 milliGRAM(s) IV Push every 8 hours PRN Nausea and/or Vomiting      I&O's Summary    24 Mar 2023 07:01  -  25 Mar 2023 07:00  --------------------------------------------------------  IN: 0 mL / OUT: 1280 mL / NET: -1280 mL        PHYSICAL EXAM:  Vital Signs Last 24 Hrs  T(C): 36.6 (25 Mar 2023 11:41), Max: 36.9 (24 Mar 2023 17:48)  T(F): 97.8 (25 Mar 2023 11:41), Max: 98.5 (24 Mar 2023 17:48)  HR: 51 (25 Mar 2023 11:41) (50 - 57)  BP: 127/60 (25 Mar 2023 11:41) (127/60 - 152/68)  BP(mean): --  RR: 18 (25 Mar 2023 11:41) (18 - 18)  SpO2: 99% (25 Mar 2023 11:41) (93% - 99%)    Parameters below as of 25 Mar 2023 11:41  Patient On (Oxygen Delivery Method): room air      PHYSICAL EXAM:  GENERAL:  Well appearing, tall m in NAD, hard of hearing  HEAD:  NCAT  EYES: conjunctiva clear  NECK: Supple, No JVD  CHEST/LUNG: CTA B/L. No w/r/r.  HEART: Reg rate. Normal S1, S2. No m/r/g.   ABDOMEN: Soft, no RUQ ttp, negative kirby's  EXTREMITIES:  2+ Peripheral Pulses, No clubbing, cyanosis  PSYCH: Appropriate affect  SKIN: No rashes or lesions      LABS:                        11.7   5.71  )-----------( 190      ( 24 Mar 2023 07:02 )             34.8     03-24    143  |  98  |  13  ----------------------------<  96  2.3<LL>   |  35<H>  |  1.06    Ca    8.2<L>      24 Mar 2023 07:02  Phos  2.3     03-24  Mg     2.2     03-24    TPro  6.4  /  Alb  3.2<L>  /  TBili  1.0  /  DBili  x   /  AST  9<L>  /  ALT  6<L>  /  AlkPhos  258<H>  03-24    PT/INR - ( 24 Mar 2023 07:02 )   PT: 13.6 sec;   INR: 1.17 ratio         PTT - ( 24 Mar 2023 07:02 )  PTT:33.3 sec            RADIOLOGY & ADDITIONAL TESTS:  Results Reviewed:  HIDA scan negative for cholecystitis  Imaging Personally Reviewed:   [facility] Division of Hospital Medicine  Marcia Mckoy MD  Pager (M-F, 8A-5P): [phone], MS Teams PREFERRED  Other Times:  [phone]      SUBJECTIVE / OVERNIGHT EVENTS: Seen and examined at bedside. He appears well. NAD.    MEDICATIONS  (STANDING):  cyanocobalamin Injectable 1000 MICROGram(s) SubCutaneous daily  folic acid 1 milliGRAM(s) Oral daily  heparin   Injectable 5000 Unit(s) SubCutaneous every 8 hours  sodium chloride 0.9%. 1000 milliLiter(s) (75 mL/Hr) IV Continuous <Continuous>    MEDICATIONS  (PRN):  acetaminophen     Tablet .. 650 milliGRAM(s) Oral every 6 hours PRN Temp greater or equal to 38C (100.4F), Mild Pain (1 - 3)  aluminum hydroxide/magnesium hydroxide/simethicone Suspension 30 milliLiter(s) Oral every 4 hours PRN Dyspepsia  melatonin 3 milliGRAM(s) Oral at bedtime PRN Insomnia  ondansetron Injectable 4 milliGRAM(s) IV Push every 8 hours PRN Nausea and/or Vomiting      I&O's Summary    24 Mar 2023 07:01  -  25 Mar 2023 07:00  --------------------------------------------------------  IN: 0 mL / OUT: 1280 mL / NET: -1280 mL        PHYSICAL EXAM:  Vital Signs Last 24 Hrs  T(C): 36.6 (25 Mar 2023 11:41), Max: 36.9 (24 Mar 2023 17:48)  T(F): 97.8 (25 Mar 2023 11:41), Max: 98.5 (24 Mar 2023 17:48)  HR: 51 (25 Mar 2023 11:41) (50 - 57)  BP: 127/60 (25 Mar 2023 11:41) (127/60 - 152/68)  BP(mean): --  RR: 18 (25 Mar 2023 11:41) (18 - 18)  SpO2: 99% (25 Mar 2023 11:41) (93% - 99%)    Parameters below as of 25 Mar 2023 11:41  Patient On (Oxygen Delivery Method): room air      PHYSICAL EXAM:  GENERAL:  Well appearing, tall m in NAD, hard of hearing  HEAD:  NCAT  EYES: conjunctiva clear  NECK: Supple, No JVD  CHEST/LUNG: CTA B/L. No w/r/r.  HEART: Reg rate. Normal S1, S2. No m/r/g.   ABDOMEN: Soft, no RUQ ttp, negative kirby's  : dark urine in fuller  EXTREMITIES:  2+ Peripheral Pulses, No clubbing, cyanosis  PSYCH: Appropriate affect  SKIN: No rashes or lesions      LABS:                        11.7   5.71  )-----------( 190      ( 24 Mar 2023 07:02 )             34.8     03-24    143  |  98  |  13  ----------------------------<  96  2.3<LL>   |  35<H>  |  1.06    Ca    8.2<L>      24 Mar 2023 07:02  Phos  2.3     03-24  Mg     2.2     03-24    TPro  6.4  /  Alb  3.2<L>  /  TBili  1.0  /  DBili  x   /  AST  9<L>  /  ALT  6<L>  /  AlkPhos  258<H>  03-24    PT/INR - ( 24 Mar 2023 07:02 )   PT: 13.6 sec;   INR: 1.17 ratio         PTT - ( 24 Mar 2023 07:02 )  PTT:33.3 sec            RADIOLOGY & ADDITIONAL TESTS:  Results Reviewed:  HIDA scan negative for cholecystitis  Imaging Personally Reviewed:

## 2023-03-25 NOTE — PROGRESS NOTE ADULT - PROBLEM SELECTOR PLAN 3
dvt ppx: hsq  PT pending    d/w daughter Lauren over phone, all ques answered 3/24 2/2 poor po intake  - kcl and phos repletion ordered  K 3.0 today  supplemented x 2

## 2023-03-25 NOTE — PROGRESS NOTE ADULT - PROBLEM SELECTOR PLAN 1
CTH shows chronic microangiopathic ischemic changes  vitamin b12 and folate deficiency noted on labs  pan scan - CT c/a/p neg for gross malignancy  no known hx of diarrhea/blood loss  - vit b12 1000 mcg sc qd while inpt for faster absorption, can switch to po in dc. Folate 1mg qd started  - r/o pernicious anemia - check intrinsic factor ab, pending result  - nutrition consult  - regular diet - added ensure tid CTH shows chronic microangiopathic ischemic changes  vitamin b12 and folate deficiency noted on labs  pan scan - CT c/a/p neg for gross malignancy  no known hx of diarrhea/blood loss  - vit b12 1000 mcg sc qd while inpt for faster absorption, can switch to po in dc. Folate 1mg qd started  - r/o pernicious anemia - check intrinsic factor ab, pending result  - nutrition consult  - regular diet - added ensure tid  Due to anemia, wt loss and suspicion of PA, consulted GI for EGD with gastric biopsy  Would need to discuss regarding colonoscopy with daughter, imaging did not reveal and malignancy but Gallbladder wall irregularly thickened with elevated ALP

## 2023-03-25 NOTE — PROGRESS NOTE ADULT - PROBLEM SELECTOR PLAN 2
2/2 poor po intake  - kcl and phos repletion ordered  - monitor labs tomorrow am UA sent, moderate blood and many abnormalities  review of imaging shows prostatomegaly and moderate bladder wall thickening  concern for prostate etiology/malignancy  send PSA  will consult Urology in the AM to determine best imaging modality  UA negative for bacteria UA sent, moderate blood and many abnormalities, concentrated, will give NS @ 60cc/hr for 12 hours  review of imaging shows prostatomegaly and moderate bladder wall thickening  concern for prostate etiology, infection vs malignancy  send PSA  will consult Urology in the AM  UA negative for bacteria

## 2023-03-25 NOTE — DIETITIAN INITIAL EVALUATION ADULT - ORAL INTAKE PTA/DIET HISTORY
Pt with minimal/poor PO intake x 1 month.  Pt very [place], unable to obtain much subjective information. Per chart, pt with minimal/poor PO intake x 1 month per chart; pt stating at this time he is fine and was eating well - ?accuracy. Family not currently at bedside, unable to reach family via phone at this time. NKFA or intolerances noted in medical record. Micronutrient supplementation PTA not noted at this time.

## 2023-03-25 NOTE — DIETITIAN INITIAL EVALUATION ADULT - REASON
Nutrition focused physical exam deferred at this time as pt unable to provide consent. Moderate muscle loss noted to temples, moderate fat loss noted to orbital area.

## 2023-03-25 NOTE — DIETITIAN INITIAL EVALUATION ADULT - OTHER INFO
-- Electrolyte abnormalities noted (Low K and Phos). Pt may be at risk for refeeding syndrome given hx of poor PO/weight loss.  -- Low B12 and folate levels noted.  -- Weight Hx: dosing wt 170lbs, bedscale wt taken at time of visit of 165lbs. -- Electrolyte abnormalities noted (Low K and Phos). Pt may be at risk for refeeding syndrome given hx of poor PO/weight loss.  -- Low B12 and folate levels noted.  -- Weight Hx: dosing wt 170lbs, bedscale wt taken at time of visit of 165lbs. Weight loss reported in H&P, pt unable to confirm at this time, reports UBW possibly in the 160s.

## 2023-03-25 NOTE — DIETITIAN INITIAL EVALUATION ADULT - NS FNS DIET ORDER
Diet, Regular:   Supplement Feeding Modality:  Oral  Ensure Clear Cans or Servings Per Day:  1       Frequency:  Three Times a day (03-24-23 @ 13:09)

## 2023-03-25 NOTE — DIETITIAN INITIAL EVALUATION ADULT - PERTINENT LABORATORY DATA
03-24    143  |  98  |  13  ----------------------------<  96  2.3<LL>   |  35<H>  |  1.06    Ca    8.2<L>      24 Mar 2023 07:02  Phos  2.3     03-24  Mg     2.2     03-24    TPro  6.4  /  Alb  3.2<L>  /  TBili  1.0  /  DBili  x   /  AST  9<L>  /  ALT  6<L>  /  AlkPhos  258<H>  03-24  A1C with Estimated Average Glucose Result: 5.6 % (03-24-23 @ 07:02)

## 2023-03-25 NOTE — PROGRESS NOTE ADULT - PROBLEM SELECTOR PLAN 4
CTAP w/ acute cholecystitis on imaging, however pt w/o symptoms, neg kirby's sign  - surgery eval  - HIDA scan negative dvt ppx: hsq  PT pending    d/w daughter Lauren dvt ppx: hsq  PT pending    d/w daughter Lauren in detail over the phone 3/25

## 2023-03-25 NOTE — DIETITIAN INITIAL EVALUATION ADULT - ENERGY INTAKE
Poor (<50%) Breakfast tray noted at bedside untouched.  Per RN pt with minimal intake of breakfast tray. Ensure Clear noted at bedside, pt states he does not like to drink supplements.

## 2023-03-25 NOTE — DIETITIAN INITIAL EVALUATION ADULT - REASON INDICATOR FOR ASSESSMENT
Consult received for significant decrease in PO intake x 3 days PTA, assessment, education.  Information obtained from pt, RN, medical record.

## 2023-03-25 NOTE — DIETITIAN INITIAL EVALUATION ADULT - ADD RECOMMEND
Add multivitamin and thiamine as pt may be at risk for refeeding syndrome (poor PO intake and weight loss PTA, electrolyte abnormalities).  Add multivitamin and thiamine as pt may be at risk for refeeding syndrome (poor PO intake and weight loss PTA, electrolyte abnormalities). Monitor/replete electrolytes PRN. Continue folate/B12 supplementation. Provide encouragement with PO intake, menu selections, and assistance with meals as needed. Continue to monitor nutritional intake, labs, weights, BM, skin, clinical course. Malnutrition alert placed in medical record

## 2023-03-25 NOTE — DIETITIAN INITIAL EVALUATION ADULT - ORAL NUTRITION SUPPLEMENTS
Change oral nutrition supplements to Ensure Plus High Protein 2 x daily (350kcal, 20gm protein each).

## 2023-03-25 NOTE — DIETITIAN INITIAL EVALUATION ADULT - PERTINENT MEDS FT
MEDICATIONS  (STANDING):  cyanocobalamin Injectable 1000 MICROGram(s) SubCutaneous daily  folic acid 1 milliGRAM(s) Oral daily  heparin   Injectable 5000 Unit(s) SubCutaneous every 8 hours  sodium chloride 0.9%. 1000 milliLiter(s) (75 mL/Hr) IV Continuous <Continuous>    MEDICATIONS  (PRN):  acetaminophen     Tablet .. 650 milliGRAM(s) Oral every 6 hours PRN Temp greater or equal to 38C (100.4F), Mild Pain (1 - 3)  aluminum hydroxide/magnesium hydroxide/simethicone Suspension 30 milliLiter(s) Oral every 4 hours PRN Dyspepsia  melatonin 3 milliGRAM(s) Oral at bedtime PRN Insomnia  ondansetron Injectable 4 milliGRAM(s) IV Push every 8 hours PRN Nausea and/or Vomiting

## 2023-03-25 NOTE — DIETITIAN NUTRITION RISK NOTIFICATION - TREATMENT: THE FOLLOWING DIET HAS BEEN RECOMMENDED
Diet, Regular:   Supplement Feeding Modality:  Oral  Ensure Clear Cans or Servings Per Day:  1       Frequency:  Three Times a day (03-24-23 @ 13:09) [Active]

## 2023-03-26 DIAGNOSIS — R19.7 DIARRHEA, UNSPECIFIED: ICD-10-CM

## 2023-03-26 LAB
ANION GAP SERPL CALC-SCNC: 12 MMOL/L — SIGNIFICANT CHANGE UP (ref 5–17)
BUN SERPL-MCNC: 12 MG/DL — SIGNIFICANT CHANGE UP (ref 7–23)
C DIFF GDH STL QL: NEGATIVE — SIGNIFICANT CHANGE UP
C DIFF GDH STL QL: SIGNIFICANT CHANGE UP
CALCIUM SERPL-MCNC: 8.1 MG/DL — LOW (ref 8.4–10.5)
CHLORIDE SERPL-SCNC: 104 MMOL/L — SIGNIFICANT CHANGE UP (ref 96–108)
CO2 SERPL-SCNC: 31 MMOL/L — SIGNIFICANT CHANGE UP (ref 22–31)
CREAT SERPL-MCNC: 1.06 MG/DL — SIGNIFICANT CHANGE UP (ref 0.5–1.3)
EGFR: 68 ML/MIN/1.73M2 — SIGNIFICANT CHANGE UP
GI PCR PANEL: SIGNIFICANT CHANGE UP
GLUCOSE SERPL-MCNC: 92 MG/DL — SIGNIFICANT CHANGE UP (ref 70–99)
HCT VFR BLD CALC: 31.7 % — LOW (ref 39–50)
HGB BLD-MCNC: 10.6 G/DL — LOW (ref 13–17)
MCHC RBC-ENTMCNC: 31 PG — SIGNIFICANT CHANGE UP (ref 27–34)
MCHC RBC-ENTMCNC: 33.4 GM/DL — SIGNIFICANT CHANGE UP (ref 32–36)
MCV RBC AUTO: 92.7 FL — SIGNIFICANT CHANGE UP (ref 80–100)
NRBC # BLD: 0 /100 WBCS — SIGNIFICANT CHANGE UP (ref 0–0)
PLATELET # BLD AUTO: 209 K/UL — SIGNIFICANT CHANGE UP (ref 150–400)
POTASSIUM SERPL-MCNC: 3.5 MMOL/L — SIGNIFICANT CHANGE UP (ref 3.5–5.3)
POTASSIUM SERPL-SCNC: 3.5 MMOL/L — SIGNIFICANT CHANGE UP (ref 3.5–5.3)
PROT SERPL-MCNC: 5.3 G/DL — LOW (ref 6–8.3)
PROT SERPL-MCNC: 5.3 G/DL — LOW (ref 6–8.3)
PSA FLD-MCNC: 5.56 NG/ML — HIGH (ref 0–4)
RBC # BLD: 3.42 M/UL — LOW (ref 4.2–5.8)
RBC # FLD: 13.2 % — SIGNIFICANT CHANGE UP (ref 10.3–14.5)
SODIUM SERPL-SCNC: 147 MMOL/L — HIGH (ref 135–145)
WBC # BLD: 5.73 K/UL — SIGNIFICANT CHANGE UP (ref 3.8–10.5)
WBC # FLD AUTO: 5.73 K/UL — SIGNIFICANT CHANGE UP (ref 3.8–10.5)

## 2023-03-26 PROCEDURE — 99233 SBSQ HOSP IP/OBS HIGH 50: CPT

## 2023-03-26 RX ORDER — THIAMINE MONONITRATE (VIT B1) 100 MG
100 TABLET ORAL DAILY
Refills: 0 | Status: DISCONTINUED | OUTPATIENT
Start: 2023-03-26 | End: 2023-03-31

## 2023-03-26 RX ORDER — TAMSULOSIN HYDROCHLORIDE 0.4 MG/1
0.4 CAPSULE ORAL AT BEDTIME
Refills: 0 | Status: DISCONTINUED | OUTPATIENT
Start: 2023-03-26 | End: 2023-03-31

## 2023-03-26 RX ORDER — SODIUM CHLORIDE 9 MG/ML
1000 INJECTION, SOLUTION INTRAVENOUS
Refills: 0 | Status: DISCONTINUED | OUTPATIENT
Start: 2023-03-26 | End: 2023-03-28

## 2023-03-26 RX ADMIN — Medication 40 MILLIEQUIVALENT(S): at 01:13

## 2023-03-26 RX ADMIN — SODIUM CHLORIDE 75 MILLILITER(S): 9 INJECTION, SOLUTION INTRAVENOUS at 14:26

## 2023-03-26 RX ADMIN — HEPARIN SODIUM 5000 UNIT(S): 5000 INJECTION INTRAVENOUS; SUBCUTANEOUS at 21:41

## 2023-03-26 RX ADMIN — Medication 1 MILLIGRAM(S): at 11:42

## 2023-03-26 RX ADMIN — PREGABALIN 1000 MICROGRAM(S): 225 CAPSULE ORAL at 11:42

## 2023-03-26 RX ADMIN — HEPARIN SODIUM 5000 UNIT(S): 5000 INJECTION INTRAVENOUS; SUBCUTANEOUS at 06:21

## 2023-03-26 RX ADMIN — Medication 1 TABLET(S): at 11:42

## 2023-03-26 RX ADMIN — HEPARIN SODIUM 5000 UNIT(S): 5000 INJECTION INTRAVENOUS; SUBCUTANEOUS at 17:04

## 2023-03-26 RX ADMIN — Medication 100 MILLIGRAM(S): at 11:42

## 2023-03-26 RX ADMIN — TAMSULOSIN HYDROCHLORIDE 0.4 MILLIGRAM(S): 0.4 CAPSULE ORAL at 21:41

## 2023-03-26 NOTE — PROGRESS NOTE ADULT - SUBJECTIVE AND OBJECTIVE BOX
[facility] Division of Hospital Medicine  Marcia Mckoy MD  Pager (M-F, 8A-5P): [phone], MS Teams PREFERRED  Other Times:  [phone]      SUBJECTIVE / OVERNIGHT EVENTS: Seen and examined at bedside. Complaining of loose, watery diarrhea. No pain.    MEDICATIONS  (STANDING):  cyanocobalamin Injectable 1000 MICROGram(s) SubCutaneous daily  folic acid 1 milliGRAM(s) Oral daily  heparin   Injectable 5000 Unit(s) SubCutaneous every 8 hours  multivitamin 1 Tablet(s) Oral daily  sodium chloride 0.9%. 1000 milliLiter(s) (75 mL/Hr) IV Continuous <Continuous>  sodium chloride 0.9%. 1000 milliLiter(s) (60 mL/Hr) IV Continuous <Continuous>  tamsulosin 0.4 milliGRAM(s) Oral at bedtime  thiamine 100 milliGRAM(s) Oral daily    MEDICATIONS  (PRN):  acetaminophen     Tablet .. 650 milliGRAM(s) Oral every 6 hours PRN Temp greater or equal to 38C (100.4F), Mild Pain (1 - 3)  aluminum hydroxide/magnesium hydroxide/simethicone Suspension 30 milliLiter(s) Oral every 4 hours PRN Dyspepsia  melatonin 3 milliGRAM(s) Oral at bedtime PRN Insomnia  ondansetron Injectable 4 milliGRAM(s) IV Push every 8 hours PRN Nausea and/or Vomiting      I&O's Summary    25 Mar 2023 07:01  -  26 Mar 2023 07:00  --------------------------------------------------------  IN: 0 mL / OUT: 600 mL / NET: -600 mL        PHYSICAL EXAM:  Vital Signs Last 24 Hrs  T(C): 36.9 (26 Mar 2023 12:36), Max: 37 (26 Mar 2023 00:15)  T(F): 98.4 (26 Mar 2023 12:36), Max: 98.6 (26 Mar 2023 00:15)  HR: 50 (26 Mar 2023 04:51) (50 - 56)  BP: 138/72 (26 Mar 2023 12:36) (123/60 - 138/72)  BP(mean): --  RR: 18 (26 Mar 2023 12:36) (18 - 18)  SpO2: 98% (26 Mar 2023 12:36) (96% - 98%)    Parameters below as of 26 Mar 2023 12:36  Patient On (Oxygen Delivery Method): room air      PHYSICAL EXAM:  GENERAL:  Well appearing, tall m in NAD, hard of hearing  HEAD:  NCAT  EYES: conjunctiva clear  NECK: Supple, No JVD  CHEST/LUNG: CTA B/L. No w/r/r.  HEART: Reg rate. Normal S1, S2. No m/r/g.   ABDOMEN: Soft, no RUQ ttp, negative kirby's  : dark urine in fuller  EXTREMITIES:  2+ Peripheral Pulses, No clubbing, cyanosis  PSYCH: Appropriate affect  SKIN: No rashes or lesions    LABS:                        10.6   5.73  )-----------( 209      ( 26 Mar 2023 07:04 )             31.7     03-26    147<H>  |  104  |  12  ----------------------------<  92  3.5   |  31  |  1.06    Ca    8.1<L>      26 Mar 2023 07:00  Phos  2.3     03-25  Mg     2.1     03-25    TPro  5.3<L>  /  Alb  x   /  TBili  x   /  DBili  x   /  AST  x   /  ALT  x   /  AlkPhos  x   03-          Urinalysis Basic - ( 25 Mar 2023 18:23 )    Color: Dark Yellow / Appearance: Clear / S.052 / pH: x  Gluc: x / Ketone: Trace  / Bili: Small / Urobili: 6 mg/dL   Blood: x / Protein: 30 mg/dL / Nitrite: Negative   Leuk Esterase: Small / RBC: 21 /hpf / WBC 18 /HPF   Sq Epi: x / Non Sq Epi: 2 /hpf / Bacteria: Negative

## 2023-03-26 NOTE — PROGRESS NOTE ADULT - PROBLEM SELECTOR PLAN 3
UA sent, moderate blood and many abnormalities, concentrated, will give NS @ 60cc/hr for 12 hours  review of imaging shows prostatomegaly and moderate bladder wall thickening  concern for prostate etiology, infection vs malignancy  sent PSA. SPEP and UPEP, follow up recommendations  UA negative for bacteria  Urology said to start Tamsulosin but would only see as outpt  continue IVF

## 2023-03-26 NOTE — PROGRESS NOTE ADULT - PROBLEM SELECTOR PLAN 1
CTH shows chronic microangiopathic ischemic changes  vitamin b12 and folate deficiency noted on labs  pan scan - CT c/a/p neg for gross malignancy  no known hx of diarrhea/blood loss  - vit b12 1000 mcg sc qd while inpt for faster absorption, can switch to po in dc. Folate 1mg qd started  - r/o pernicious anemia - check intrinsic factor ab, pending result  - nutrition consult  - regular diet - added ensure tid  Due to anemia, wt loss and suspicion of PA, consulted GI for EGD with gastric biopsy 3.25, follow up recommendations  Would need to discuss regarding colonoscopy with daughter, imaging did not reveal and malignancy but Gallbladder wall irregularly thickened with elevated ALP CTH shows chronic microangiopathic ischemic changes  vitamin b12 and folate deficiency noted on labs  pan scan - CT c/a/p neg for gross malignancy  no known hx of diarrhea/blood loss  - vit b12 1000 mcg sc qd while inpt for faster absorption, can switch to po in dc. Folate 1mg qd started  - r/o pernicious anemia - check intrinsic factor ab, pending result  - nutrition consult  - regular diet - added ensure tid  Due to anemia, wt loss and suspicion of PA, consulted GI but feel that anemia not profound enough for EGD, would perform if pt fails SLP and has dysphagia  imaging did not reveal and malignancy but Gallbladder wall irregularly thickened with elevated ALP

## 2023-03-27 LAB
ALBUMIN SERPL ELPH-MCNC: 3.1 G/DL — LOW (ref 3.3–5)
ALP SERPL-CCNC: 281 U/L — HIGH (ref 40–120)
ALT FLD-CCNC: 5 U/L — LOW (ref 10–45)
ANION GAP SERPL CALC-SCNC: 13 MMOL/L — SIGNIFICANT CHANGE UP (ref 5–17)
AST SERPL-CCNC: 13 U/L — SIGNIFICANT CHANGE UP (ref 10–40)
BILIRUB SERPL-MCNC: 0.7 MG/DL — SIGNIFICANT CHANGE UP (ref 0.2–1.2)
BUN SERPL-MCNC: 10 MG/DL — SIGNIFICANT CHANGE UP (ref 7–23)
CALCIUM SERPL-MCNC: 8.2 MG/DL — LOW (ref 8.4–10.5)
CHLORIDE SERPL-SCNC: 99 MMOL/L — SIGNIFICANT CHANGE UP (ref 96–108)
CO2 SERPL-SCNC: 29 MMOL/L — SIGNIFICANT CHANGE UP (ref 22–31)
CREAT SERPL-MCNC: 1 MG/DL — SIGNIFICANT CHANGE UP (ref 0.5–1.3)
CULTURE RESULTS: NO GROWTH — SIGNIFICANT CHANGE UP
EGFR: 73 ML/MIN/1.73M2 — SIGNIFICANT CHANGE UP
GLUCOSE SERPL-MCNC: 139 MG/DL — HIGH (ref 70–99)
POTASSIUM SERPL-MCNC: 3.2 MMOL/L — LOW (ref 3.5–5.3)
POTASSIUM SERPL-SCNC: 3.2 MMOL/L — LOW (ref 3.5–5.3)
PROT SERPL-MCNC: 6.6 G/DL — SIGNIFICANT CHANGE UP (ref 6–8.3)
SODIUM SERPL-SCNC: 141 MMOL/L — SIGNIFICANT CHANGE UP (ref 135–145)
SPECIMEN SOURCE: SIGNIFICANT CHANGE UP

## 2023-03-27 PROCEDURE — 93010 ELECTROCARDIOGRAM REPORT: CPT

## 2023-03-27 PROCEDURE — 99232 SBSQ HOSP IP/OBS MODERATE 35: CPT

## 2023-03-27 RX ADMIN — PREGABALIN 1000 MICROGRAM(S): 225 CAPSULE ORAL at 12:12

## 2023-03-27 RX ADMIN — HEPARIN SODIUM 5000 UNIT(S): 5000 INJECTION INTRAVENOUS; SUBCUTANEOUS at 21:42

## 2023-03-27 RX ADMIN — TAMSULOSIN HYDROCHLORIDE 0.4 MILLIGRAM(S): 0.4 CAPSULE ORAL at 21:42

## 2023-03-27 RX ADMIN — HEPARIN SODIUM 5000 UNIT(S): 5000 INJECTION INTRAVENOUS; SUBCUTANEOUS at 13:08

## 2023-03-27 RX ADMIN — Medication 1 MILLIGRAM(S): at 12:17

## 2023-03-27 RX ADMIN — Medication 100 MILLIGRAM(S): at 12:14

## 2023-03-27 RX ADMIN — Medication 1 TABLET(S): at 12:18

## 2023-03-27 RX ADMIN — HEPARIN SODIUM 5000 UNIT(S): 5000 INJECTION INTRAVENOUS; SUBCUTANEOUS at 05:15

## 2023-03-27 NOTE — PROGRESS NOTE ADULT - PROBLEM SELECTOR PLAN 6
CTAP w/ acute cholecystitis on imaging, however pt w/o symptoms, neg kirby's sign  - surgery eval pending  - HIDA scan negative.
CTAP w/ acute cholecystitis on imaging, however pt w/o symptoms, neg kirby's sign  - surgery eval  - HIDA scan negative

## 2023-03-27 NOTE — SWALLOW BEDSIDE ASSESSMENT ADULT - ASR SWALLOW DENTITION
missing entire upper dentition and right lower lateral dentition./edentulous, does not have dentures/incomplete scattered dentition present/incomplete

## 2023-03-27 NOTE — SWALLOW BEDSIDE ASSESSMENT ADULT - SLP PERTINENT HISTORY OF CURRENT PROBLEM
88 yo m w no pmh, p/w generalized fatigue/weakness adm for FTT, found hypokalemic, vit b12 and folate deficient, CT w/ ?acute cholecystitis. Per attending, due to anemia, wt loss and suspicion of pernicious anemia, consulted GI but feel that anemia not profound enough for EGD, would perform if pt fails SLP and has dysphagia. Pt hx unremarkable for speech & swallow intervention.

## 2023-03-27 NOTE — PROGRESS NOTE ADULT - PROBLEM SELECTOR PLAN 1
CTH shows chronic microangiopathic ischemic changes  vitamin b12 and folate deficiency noted on labs  pan scan - CT c/a/p neg for gross malignancy  no known hx of diarrhea/blood loss  - vit b12 1000 mcg sc qd while inpt for faster absorption, can switch to po in dc. Folate 1mg qd started  - r/o pernicious anemia - check intrinsic factor ab, pending result  - nutrition consult  - regular diet - added ensure tid  Due to anemia, wt loss and suspicion of PA, consulted GI but feel that anemia not profound enough for EGD, would perform if pt fails SLP and has dysphagia  imaging did not reveal and malignancy but Gallbladder wall irregularly thickened with elevated ALP.

## 2023-03-27 NOTE — SWALLOW BEDSIDE ASSESSMENT ADULT - COMMENTS
IMAGING:  CXR -  Lungs are clear.  CT BRAIN - No acute hemorrhage, mass or midline shift.  CT Chest - No evidence of malignancy. Distended gallbladder with irregular wall thickening and pericholecystic inflammation consistent with acute cholecystitis.

## 2023-03-27 NOTE — SWALLOW BEDSIDE ASSESSMENT ADULT - SLP GENERAL OBSERVATIONS
Pt encountered asleep at bedside; able to be roused with verbal cues; AAOx3. Pt's daughter present. Pt able to follow simple commands to participate in the eval, required repetition 2/2 [place] [employer] [place]. Pt's daughter provided reliable information regarding her father's recent refusal to eat. Pt's daughter reported that he "lost his taste buds so everything tastes bad". Vocal quality WFL.

## 2023-03-27 NOTE — SWALLOW BEDSIDE ASSESSMENT ADULT - SWALLOW EVAL: DIAGNOSIS
88 yo m w no pmh, p/w generalized fatigue/weakness adm for FTT, found hypokalemic, vit b12 and folate deficient. Pt presents with a mild oral dysphagia. Swallow sequence is marked by prolonged mastication and delayed oral transit time, likely 2/2 missing upper and lower dentition; suspected timely initiation of pharyngeal swallow; trace residue of solids in right lateral sulcus. No overt signs or symptoms of penetration/aspiration observed. Feeding difficulties likely c/b loss of appetite and "loss of taste buds" per pt report. 86 yo m w no pmh, p/w generalized fatigue/weakness adm for FTT, found hypokalemic, vit b12 and folate deficient. Pt presents with a mild oral dysphagia. Swallow sequence is marked by prolonged mastication and delayed oral transit time, likely 2/2 missing upper and lower dentition; suspected timely initiation of pharyngeal swallow; trace residue of solids in right lateral sulcus. No overt signs or symptoms of penetration/aspiration observed. Suspect pt's poor PO intake is unrelated to oral deficits identified on this exam.

## 2023-03-27 NOTE — PROGRESS NOTE ADULT - PROBLEM SELECTOR PLAN 3
UA sent, moderate blood and many abnormalities, concentrated,  review of imaging shows prostatomegaly and moderate bladder wall thickening  concern for prostate etiology, infection vs malignancy  PSA 5.56. SPEP and UPEP, pending  UA negative for bacteria  Urology said to start Tamsulosin but would only see as outpt  IVF PRN

## 2023-03-27 NOTE — PROGRESS NOTE ADULT - SUBJECTIVE AND OBJECTIVE BOX
[facility] Division of Hospital Medicine  Donovan Choe MD  Available via MS Teams      SUBJECTIVE / OVERNIGHT EVENTS:  Seen and examined at bedside. He denies any complaints     ADDITIONAL REVIEW OF SYSTEMS:  REVIEW OF SYSTEMS:    CONSTITUTIONAL: No weakness, fevers or chills  EYES: no blurry vision or eye pain.   ENT: No throat pain. No dysphagia.    NECK: No pain or stiffness  RESPIRATORY: No cough, wheezing, hemoptysis; No shortness of breath  CARDIOVASCULAR: No chest pain or palpitations.  GASTROINTESTINAL: No abdominal pain. No nausea or vomiting; No diarrhea or constipation. No melena or hematochezia.  GENITOURINARY: No dysuria, frequency or hematuria  NEUROLOGICAL: No numbness or weakness. No dizziness or falls.   SKIN: No itching, burning, rashes, or lesions.   LYMPHATIC: No masses or swelling.   All other review of systems is negative unless indicated above.    MEDICATIONS  (STANDING):  cyanocobalamin Injectable 1000 MICROGram(s) SubCutaneous daily  folic acid 1 milliGRAM(s) Oral daily  heparin   Injectable 5000 Unit(s) SubCutaneous every 8 hours  multivitamin 1 Tablet(s) Oral daily  sodium chloride 0.45%. 1000 milliLiter(s) (75 mL/Hr) IV Continuous <Continuous>  tamsulosin 0.4 milliGRAM(s) Oral at bedtime  thiamine 100 milliGRAM(s) Oral daily    MEDICATIONS  (PRN):  acetaminophen     Tablet .. 650 milliGRAM(s) Oral every 6 hours PRN Temp greater or equal to 38C (100.4F), Mild Pain (1 - 3)  aluminum hydroxide/magnesium hydroxide/simethicone Suspension 30 milliLiter(s) Oral every 4 hours PRN Dyspepsia  melatonin 3 milliGRAM(s) Oral at bedtime PRN Insomnia  ondansetron Injectable 4 milliGRAM(s) IV Push every 8 hours PRN Nausea and/or Vomiting      I&O's Summary    26 Mar 2023 07:01  -  27 Mar 2023 07:00  --------------------------------------------------------  IN: 0 mL / OUT: 350 mL / NET: -350 mL        PHYSICAL EXAM:  Vital Signs Last 24 Hrs  T(C): 36.4 (27 Mar 2023 04:37), Max: 37 (26 Mar 2023 20:20)  T(F): 97.6 (27 Mar 2023 04:37), Max: 98.6 (26 Mar 2023 20:20)  HR: 53 (27 Mar 2023 04:37) (49 - 53)  BP: 140/65 (27 Mar 2023 04:37) (132/63 - 140/65)  BP(mean): --  RR: 18 (27 Mar 2023 04:37) (18 - 18)  SpO2: 98% (27 Mar 2023 04:37) (97% - 98%)    Parameters below as of 27 Mar 2023 04:37  Patient On (Oxygen Delivery Method): room air      GENERAL:  Well appearing,  NAD, hard of hearing  HEAD:  NCAT  EYES: conjunctiva clear  NECK: Supple, No JVD  CHEST/LUNG: CTA B/L. No w/r/r.  HEART: Reg rate. Normal S1, S2. No m/r/g.   ABDOMEN: Soft, no RUQ ttp, negative kirby's  : dark urine in fuller  EXTREMITIES:  2+ Peripheral Pulses, No clubbing, cyanosis  PSYCH: Appropriate affect  SKIN: No rashes or lesions  LABS:                        10.6   5.73  )-----------( 209      ( 26 Mar 2023 07:04 )             31.7     03-26    147<H>  |  104  |  12  ----------------------------<  92  3.5   |  31  |  1.06    Ca    8.1<L>      26 Mar 2023 07:00  Phos  2.3     03-25  Mg     2.1     03-25    TPro  5.3<L>  /  Alb  x   /  TBili  x   /  DBili  x   /  AST  x   /  ALT  x   /  AlkPhos  x   03-26          Urinalysis Basic - ( 25 Mar 2023 18:23 )    Color: Dark Yellow / Appearance: Clear / S.052 / pH: x  Gluc: x / Ketone: Trace  / Bili: Small / Urobili: 6 mg/dL   Blood: x / Protein: 30 mg/dL / Nitrite: Negative   Leuk Esterase: Small / RBC: 21 /hpf / WBC 18 /HPF   Sq Epi: x / Non Sq Epi: 2 /hpf / Bacteria: Negative        Culture - Urine (collected 26 Mar 2023 07:27)  Source: Catheterized Catheterized  Final Report (27 Mar 2023 06:20):    No growth          RADIOLOGY & ADDITIONAL TESTS:  ekg, labs, micro imaging personally reviewed      COORDINATION OF CARE:  care discussed and coordinated with consultants.

## 2023-03-27 NOTE — SWALLOW BEDSIDE ASSESSMENT ADULT - ORAL PHASE
Within functional limits Delayed oral transit time/Stasis in lateral sulci cleared upon liquid wash/Delayed oral transit time/Stasis in lateral sulci

## 2023-03-27 NOTE — SWALLOW BEDSIDE ASSESSMENT ADULT - ASPIRATION PRECAUTIONS
Monitor for s/s aspiration/laryngeal penetration. If noted:  D/C p.o. intake, provide non-oral nutrition/hydration/meds, and contact this service @ [phone]

## 2023-03-28 LAB
CREATININE, URINE RESULT: 256 MG/DL — SIGNIFICANT CHANGE UP
GGT SERPL-CCNC: 20 U/L — SIGNIFICANT CHANGE UP (ref 9–50)
IF BLOCK AB SER-ACNC: 0.9 AU/ML — SIGNIFICANT CHANGE UP (ref 0–1.1)
PROT ?TM UR-MCNC: 65 MG/DL — HIGH (ref 0–12)
PROT ?TM UR-MCNC: 65 MG/DL — HIGH (ref 0–12)

## 2023-03-28 PROCEDURE — 99232 SBSQ HOSP IP/OBS MODERATE 35: CPT

## 2023-03-28 RX ORDER — POTASSIUM CHLORIDE 20 MEQ
40 PACKET (EA) ORAL EVERY 4 HOURS
Refills: 0 | Status: COMPLETED | OUTPATIENT
Start: 2023-03-28 | End: 2023-03-28

## 2023-03-28 RX ADMIN — Medication 100 MILLIGRAM(S): at 11:31

## 2023-03-28 RX ADMIN — Medication 1 MILLIGRAM(S): at 11:31

## 2023-03-28 RX ADMIN — TAMSULOSIN HYDROCHLORIDE 0.4 MILLIGRAM(S): 0.4 CAPSULE ORAL at 22:27

## 2023-03-28 RX ADMIN — HEPARIN SODIUM 5000 UNIT(S): 5000 INJECTION INTRAVENOUS; SUBCUTANEOUS at 14:00

## 2023-03-28 RX ADMIN — PREGABALIN 1000 MICROGRAM(S): 225 CAPSULE ORAL at 11:31

## 2023-03-28 RX ADMIN — Medication 40 MILLIEQUIVALENT(S): at 19:47

## 2023-03-28 RX ADMIN — HEPARIN SODIUM 5000 UNIT(S): 5000 INJECTION INTRAVENOUS; SUBCUTANEOUS at 22:27

## 2023-03-28 RX ADMIN — Medication 40 MILLIEQUIVALENT(S): at 22:26

## 2023-03-28 RX ADMIN — Medication 1 TABLET(S): at 11:31

## 2023-03-28 RX ADMIN — HEPARIN SODIUM 5000 UNIT(S): 5000 INJECTION INTRAVENOUS; SUBCUTANEOUS at 05:22

## 2023-03-28 NOTE — PROGRESS NOTE ADULT - PROBLEM SELECTOR PLAN 1
CTH shows chronic microangiopathic ischemic changes  vitamin b12 and folate deficiency noted on labs  pan scan - CT c/a/p neg for gross malignancy  no known hx of diarrhea/blood loss  - vit b12 1000 mcg sc qd while inpt,  can switch to po in dc. Folate 1mg qd started  - r/o pernicious anemia - check intrinsic factor ab, pending result  - nutrition consulted  - regular diet - added ensure tid  Due to anemia, wt loss and suspicion of PA, consulted GI but feel that anemia not profound enough for EGD, would perform if pt fails SLP and has dysphagia  ALP elevated, GGT wnl, passed S/S, regular diet started

## 2023-03-28 NOTE — PROGRESS NOTE ADULT - PROBLEM SELECTOR PLAN 3
UA sent, moderate blood and many abnormalities, concentrated,  review of imaging shows prostatomegaly and moderate bladder wall thickening  concern for prostate etiology, infection vs malignancy  PSA 5.56. SPEP and UPEP, pending  UA negative for bacteria  started on tamsulosin

## 2023-03-28 NOTE — PROGRESS NOTE ADULT - SUBJECTIVE AND OBJECTIVE BOX
[facility] Division of Hospital Medicine  Donovan Choe MD  Available via MS Teams      SUBJECTIVE / OVERNIGHT EVENTS:  No acute events overnight.   Patient denies any complains, says he fells well.     ADDITIONAL REVIEW OF SYSTEMS:  negative, as above    MEDICATIONS  (STANDING):  cyanocobalamin Injectable 1000 MICROGram(s) SubCutaneous daily  folic acid 1 milliGRAM(s) Oral daily  heparin   Injectable 5000 Unit(s) SubCutaneous every 8 hours  multivitamin 1 Tablet(s) Oral daily  tamsulosin 0.4 milliGRAM(s) Oral at bedtime  thiamine 100 milliGRAM(s) Oral daily    MEDICATIONS  (PRN):  acetaminophen     Tablet .. 650 milliGRAM(s) Oral every 6 hours PRN Temp greater or equal to 38C (100.4F), Mild Pain (1 - 3)  aluminum hydroxide/magnesium hydroxide/simethicone Suspension 30 milliLiter(s) Oral every 4 hours PRN Dyspepsia  melatonin 3 milliGRAM(s) Oral at bedtime PRN Insomnia  ondansetron Injectable 4 milliGRAM(s) IV Push every 8 hours PRN Nausea and/or Vomiting      I&O's Summary    27 Mar 2023 07:01  -  28 Mar 2023 07:00  --------------------------------------------------------  IN: 1005 mL / OUT: 600 mL / NET: 405 mL        PHYSICAL EXAM:  Vital Signs Last 24 Hrs  T(C): 36.7 (28 Mar 2023 04:23), Max: 36.9 (27 Mar 2023 20:42)  T(F): 98.1 (28 Mar 2023 04:23), Max: 98.5 (27 Mar 2023 20:42)  HR: 56 (28 Mar 2023 04:23) (53 - 59)  BP: 112/61 (28 Mar 2023 04:23) (112/61 - 142/73)  BP(mean): --  RR: 17 (28 Mar 2023 04:23) (17 - 18)  SpO2: 99% (28 Mar 2023 04:23) (96% - 99%)    Parameters below as of 28 Mar 2023 04:23  Patient On (Oxygen Delivery Method): room air      GENERAL:  Well appearing,  NAD, hard of hearing  HEAD:  NCAT  EYES: conjunctiva clear  NECK: Supple, No JVD  CHEST/LUNG: CTA B/L. No w/r/r.  HEART: Reg rate. Normal S1, S2. No m/r/g.   ABDOMEN: Soft, no RUQ ttp, negative kirby's  : dark urine in fuller  EXTREMITIES:  2+ Peripheral Pulses, No clubbing, cyanosis  PSYCH: Appropriate affect  SKIN: No rashes or lesions  LABS:    03-27    141  |  99  |  10  ----------------------------<  139<H>  3.2<L>   |  29  |  1.00    Ca    8.2<L>      27 Mar 2023 18:25    TPro  6.6  /  Alb  3.1<L>  /  TBili  0.7  /  DBili  x   /  AST  13  /  ALT  5<L>  /  AlkPhos  281<H>  03-27              Culture - Urine (collected 26 Mar 2023 07:27)  Source: Catheterized Catheterized  Final Report (27 Mar 2023 06:20):    No growth          RADIOLOGY & ADDITIONAL TESTS:  ekg, labs, micro imaging personally reviewed      COORDINATION OF CARE:  care discussed and coordinated with consultants.

## 2023-03-29 LAB
% ALBUMIN: 46.5 % — SIGNIFICANT CHANGE UP
% ALPHA 1: 6.2 % — SIGNIFICANT CHANGE UP
% ALPHA 2: 15.2 % — SIGNIFICANT CHANGE UP
% BETA: 10.8 % — SIGNIFICANT CHANGE UP
% GAMMA: 21.3 % — SIGNIFICANT CHANGE UP
% M SPIKE: 11.5 % — SIGNIFICANT CHANGE UP
ALBUMIN SERPL ELPH-MCNC: 2.5 G/DL — LOW (ref 3.6–5.5)
ALBUMIN/GLOB SERPL ELPH: 0.9 RATIO — SIGNIFICANT CHANGE UP
ALPHA1 GLOB SERPL ELPH-MCNC: 0.3 G/DL — SIGNIFICANT CHANGE UP (ref 0.1–0.4)
ALPHA2 GLOB SERPL ELPH-MCNC: 0.8 G/DL — SIGNIFICANT CHANGE UP (ref 0.5–1)
ANION GAP SERPL CALC-SCNC: 6 MMOL/L — SIGNIFICANT CHANGE UP (ref 5–17)
ANION GAP SERPL CALC-SCNC: 8 MMOL/L — SIGNIFICANT CHANGE UP (ref 5–17)
B-GLOBULIN SERPL ELPH-MCNC: 0.6 G/DL — SIGNIFICANT CHANGE UP (ref 0.5–1)
BUN SERPL-MCNC: 8 MG/DL — SIGNIFICANT CHANGE UP (ref 7–23)
BUN SERPL-MCNC: 9 MG/DL — SIGNIFICANT CHANGE UP (ref 7–23)
CALCIUM SERPL-MCNC: 7.8 MG/DL — LOW (ref 8.4–10.5)
CALCIUM SERPL-MCNC: 8.2 MG/DL — LOW (ref 8.4–10.5)
CHLORIDE SERPL-SCNC: 100 MMOL/L — SIGNIFICANT CHANGE UP (ref 96–108)
CHLORIDE SERPL-SCNC: 99 MMOL/L — SIGNIFICANT CHANGE UP (ref 96–108)
CO2 SERPL-SCNC: 30 MMOL/L — SIGNIFICANT CHANGE UP (ref 22–31)
CO2 SERPL-SCNC: 32 MMOL/L — HIGH (ref 22–31)
CREAT SERPL-MCNC: 1.06 MG/DL — SIGNIFICANT CHANGE UP (ref 0.5–1.3)
CREAT SERPL-MCNC: 1.12 MG/DL — SIGNIFICANT CHANGE UP (ref 0.5–1.3)
EGFR: 64 ML/MIN/1.73M2 — SIGNIFICANT CHANGE UP
EGFR: 68 ML/MIN/1.73M2 — SIGNIFICANT CHANGE UP
GAMMA GLOBULIN: 1.1 G/DL — SIGNIFICANT CHANGE UP (ref 0.6–1.6)
GLUCOSE SERPL-MCNC: 104 MG/DL — HIGH (ref 70–99)
GLUCOSE SERPL-MCNC: 96 MG/DL — SIGNIFICANT CHANGE UP (ref 70–99)
HCT VFR BLD CALC: 30 % — LOW (ref 39–50)
HGB BLD-MCNC: 10.2 G/DL — LOW (ref 13–17)
INTERPRETATION SERPL IFE-IMP: SIGNIFICANT CHANGE UP
M-SPIKE: 0.6 G/DL — HIGH (ref 0–0)
MAGNESIUM SERPL-MCNC: 1.9 MG/DL — SIGNIFICANT CHANGE UP (ref 1.6–2.6)
MCHC RBC-ENTMCNC: 30.8 PG — SIGNIFICANT CHANGE UP (ref 27–34)
MCHC RBC-ENTMCNC: 34 GM/DL — SIGNIFICANT CHANGE UP (ref 32–36)
MCV RBC AUTO: 90.6 FL — SIGNIFICANT CHANGE UP (ref 80–100)
NRBC # BLD: 0 /100 WBCS — SIGNIFICANT CHANGE UP (ref 0–0)
PHOSPHATE SERPL-MCNC: 2.2 MG/DL — LOW (ref 2.5–4.5)
PLATELET # BLD AUTO: 208 K/UL — SIGNIFICANT CHANGE UP (ref 150–400)
POTASSIUM SERPL-MCNC: 3 MMOL/L — LOW (ref 3.5–5.3)
POTASSIUM SERPL-MCNC: 4 MMOL/L — SIGNIFICANT CHANGE UP (ref 3.5–5.3)
POTASSIUM SERPL-SCNC: 3 MMOL/L — LOW (ref 3.5–5.3)
POTASSIUM SERPL-SCNC: 4 MMOL/L — SIGNIFICANT CHANGE UP (ref 3.5–5.3)
PROT PATTERN SERPL ELPH-IMP: SIGNIFICANT CHANGE UP
RBC # BLD: 3.31 M/UL — LOW (ref 4.2–5.8)
RBC # FLD: 12.8 % — SIGNIFICANT CHANGE UP (ref 10.3–14.5)
SARS-COV-2 RNA SPEC QL NAA+PROBE: SIGNIFICANT CHANGE UP
SODIUM SERPL-SCNC: 137 MMOL/L — SIGNIFICANT CHANGE UP (ref 135–145)
SODIUM SERPL-SCNC: 138 MMOL/L — SIGNIFICANT CHANGE UP (ref 135–145)
WBC # BLD: 4.41 K/UL — SIGNIFICANT CHANGE UP (ref 3.8–10.5)
WBC # FLD AUTO: 4.41 K/UL — SIGNIFICANT CHANGE UP (ref 3.8–10.5)

## 2023-03-29 PROCEDURE — 99232 SBSQ HOSP IP/OBS MODERATE 35: CPT

## 2023-03-29 RX ORDER — POLYETHYLENE GLYCOL 3350 17 G/17G
17 POWDER, FOR SOLUTION ORAL AT BEDTIME
Refills: 0 | Status: DISCONTINUED | OUTPATIENT
Start: 2023-03-29 | End: 2023-03-31

## 2023-03-29 RX ORDER — POLYETHYLENE GLYCOL 3350 17 G/17G
17 POWDER, FOR SOLUTION ORAL ONCE
Refills: 0 | Status: COMPLETED | OUTPATIENT
Start: 2023-03-29 | End: 2023-03-29

## 2023-03-29 RX ORDER — SENNA PLUS 8.6 MG/1
2 TABLET ORAL AT BEDTIME
Refills: 0 | Status: DISCONTINUED | OUTPATIENT
Start: 2023-03-29 | End: 2023-03-31

## 2023-03-29 RX ORDER — POTASSIUM CHLORIDE 20 MEQ
40 PACKET (EA) ORAL ONCE
Refills: 0 | Status: COMPLETED | OUTPATIENT
Start: 2023-03-29 | End: 2023-03-29

## 2023-03-29 RX ADMIN — PREGABALIN 1000 MICROGRAM(S): 225 CAPSULE ORAL at 11:26

## 2023-03-29 RX ADMIN — TAMSULOSIN HYDROCHLORIDE 0.4 MILLIGRAM(S): 0.4 CAPSULE ORAL at 21:28

## 2023-03-29 RX ADMIN — SENNA PLUS 2 TABLET(S): 8.6 TABLET ORAL at 21:28

## 2023-03-29 RX ADMIN — POLYETHYLENE GLYCOL 3350 17 GRAM(S): 17 POWDER, FOR SOLUTION ORAL at 21:28

## 2023-03-29 RX ADMIN — Medication 40 MILLIEQUIVALENT(S): at 11:26

## 2023-03-29 RX ADMIN — HEPARIN SODIUM 5000 UNIT(S): 5000 INJECTION INTRAVENOUS; SUBCUTANEOUS at 21:28

## 2023-03-29 RX ADMIN — Medication 100 MILLIGRAM(S): at 11:27

## 2023-03-29 RX ADMIN — Medication 1 TABLET(S): at 11:26

## 2023-03-29 RX ADMIN — Medication 1 MILLIGRAM(S): at 11:26

## 2023-03-29 RX ADMIN — HEPARIN SODIUM 5000 UNIT(S): 5000 INJECTION INTRAVENOUS; SUBCUTANEOUS at 13:12

## 2023-03-29 RX ADMIN — HEPARIN SODIUM 5000 UNIT(S): 5000 INJECTION INTRAVENOUS; SUBCUTANEOUS at 05:07

## 2023-03-29 RX ADMIN — POLYETHYLENE GLYCOL 3350 17 GRAM(S): 17 POWDER, FOR SOLUTION ORAL at 14:10

## 2023-03-29 NOTE — PROGRESS NOTE ADULT - SUBJECTIVE AND OBJECTIVE BOX
[facility] Division of Hospital Medicine  Donovan Choe MD  Available via MS Teams      SUBJECTIVE / OVERNIGHT EVENTS:  No acute events overnight. Patient seen and examined at bedside. Daughter was at bedside, and provided update.   No other complaints by patient    ADDITIONAL REVIEW OF SYSTEMS:  negative, as above    MEDICATIONS  (STANDING):  cyanocobalamin Injectable 1000 MICROGram(s) SubCutaneous daily  folic acid 1 milliGRAM(s) Oral daily  heparin   Injectable 5000 Unit(s) SubCutaneous every 8 hours  multivitamin 1 Tablet(s) Oral daily  polyethylene glycol 3350 17 Gram(s) Oral at bedtime  polyethylene glycol 3350 17 Gram(s) Oral once  senna 2 Tablet(s) Oral at bedtime  tamsulosin 0.4 milliGRAM(s) Oral at bedtime  thiamine 100 milliGRAM(s) Oral daily    MEDICATIONS  (PRN):  acetaminophen     Tablet .. 650 milliGRAM(s) Oral every 6 hours PRN Temp greater or equal to 38C (100.4F), Mild Pain (1 - 3)  aluminum hydroxide/magnesium hydroxide/simethicone Suspension 30 milliLiter(s) Oral every 4 hours PRN Dyspepsia  melatonin 3 milliGRAM(s) Oral at bedtime PRN Insomnia  ondansetron Injectable 4 milliGRAM(s) IV Push every 8 hours PRN Nausea and/or Vomiting      I&O's Summary    28 Mar 2023 07:01  -  29 Mar 2023 07:00  --------------------------------------------------------  IN: 720 mL / OUT: 230 mL / NET: 490 mL    29 Mar 2023 07:01  -  29 Mar 2023 13:56  --------------------------------------------------------  IN: 0 mL / OUT: 100 mL / NET: -100 mL        PHYSICAL EXAM:  Vital Signs Last 24 Hrs  T(C): 36.4 (29 Mar 2023 12:32), Max: 36.8 (28 Mar 2023 21:03)  T(F): 97.5 (29 Mar 2023 12:32), Max: 98.2 (28 Mar 2023 21:03)  HR: 68 (29 Mar 2023 12:32) (60 - 72)  BP: 128/64 (29 Mar 2023 12:32) (108/67 - 133/70)  BP(mean): --  RR: 18 (29 Mar 2023 12:32) (17 - 18)  SpO2: 98% (29 Mar 2023 12:32) (98% - 99%)    Parameters below as of 29 Mar 2023 12:32  Patient On (Oxygen Delivery Method): room air      CONSTITUTIONAL: NAD, well-developed, well-groomed  EYES: PERRLA; conjunctiva and sclera clear  ENMT: Moist oral mucosa, no pharyngeal injection or exudates; normal dentition  NECK: Supple, no palpable masses; no thyromegaly  RESPIRATORY: Normal respiratory effort; lungs are clear to auscultation bilaterally  CARDIOVASCULAR: Regular rate and rhythm, normal S1 and S2, no murmur/rub/gallop; No lower extremity edema; Peripheral pulses are 2+ bilaterally  ABDOMEN: Nontender to palpation, normoactive bowel sounds, no rebound/guarding; No hepatosplenomegaly  MUSCULOSKELETAL:  Normal gait; no clubbing or cyanosis of digits; no joint swelling or tenderness to palpation  PSYCH: A+O to person, place, and time; affect appropriate  NEUROLOGY: CN 2-12 are intact and symmetric; no gross sensory deficits   SKIN: No rashes; no palpable lesions    LABS:                        10.2   4.41  )-----------( 208      ( 29 Mar 2023 07:15 )             30.0     03-29    137  |  99  |  9   ----------------------------<  96  3.0<L>   |  30  |  1.12    Ca    7.8<L>      29 Mar 2023 07:13  Phos  2.2     03-29  Mg     1.9     03-29    TPro  6.6  /  Alb  3.1<L>  /  TBili  0.7  /  DBili  x   /  AST  13  /  ALT  5<L>  /  AlkPhos  281<H>  03-27              COVID-19 PCR: NotDetec (28 Mar 2023 22:34)      RADIOLOGY & ADDITIONAL TESTS:  ekg, labs, micro imaging personally reviewed      COORDINATION OF CARE:  care discussed and coordinated with consultants.

## 2023-03-29 NOTE — PROGRESS NOTE ADULT - PROBLEM SELECTOR PLAN 1
CTH shows chronic microangiopathic ischemic changes  vitamin b12 and folate deficiency noted on labs  pan scan - CT c/a/p neg for gross malignancy  no known hx of diarrhea/blood loss  - vit b12 1000 mcg sc qd while inpt,  can switch to po in dc. Folate 1mg qd started  - nutrition consulted  - regular diet - added ensure tid  Due to anemia, wt loss and suspicion of PA, consulted GI but feel that anemia not profound enough for EGD  ALP elevated, GGT wnl, passed S/S, regular diet started.  family requesting to start dronabinol or alternative(mirtazapine) to stimulate appetite, discussed with daughter with this possibility, but explained that poor appetite is a by product of dementia.   will have to revisit discussion, as she would like to discuss further with her family

## 2023-03-29 NOTE — PROGRESS NOTE ADULT - PROBLEM SELECTOR PLAN 3
possibly related to dehydration  UA sent, moderate blood and many abnormalities, concentrated,  review of imaging shows prostatomegaly and moderate bladder wall thickening  concern for prostate etiology,  PSA 5.56.   UA negative for bacteria  started on tamsulosin.

## 2023-03-30 PROCEDURE — 99232 SBSQ HOSP IP/OBS MODERATE 35: CPT

## 2023-03-30 RX ORDER — MIRTAZAPINE 45 MG/1
7.5 TABLET, ORALLY DISINTEGRATING ORAL AT BEDTIME
Refills: 0 | Status: DISCONTINUED | OUTPATIENT
Start: 2023-03-30 | End: 2023-03-31

## 2023-03-30 RX ORDER — ACETAMINOPHEN 500 MG
1000 TABLET ORAL ONCE
Refills: 0 | Status: DISCONTINUED | OUTPATIENT
Start: 2023-03-30 | End: 2023-03-30

## 2023-03-30 RX ORDER — GLYCERIN ADULT
1 SUPPOSITORY, RECTAL RECTAL ONCE
Refills: 0 | Status: COMPLETED | OUTPATIENT
Start: 2023-03-30 | End: 2023-03-30

## 2023-03-30 RX ADMIN — HEPARIN SODIUM 5000 UNIT(S): 5000 INJECTION INTRAVENOUS; SUBCUTANEOUS at 21:39

## 2023-03-30 RX ADMIN — Medication 1 SUPPOSITORY(S): at 21:50

## 2023-03-30 RX ADMIN — Medication 1 TABLET(S): at 13:50

## 2023-03-30 RX ADMIN — HEPARIN SODIUM 5000 UNIT(S): 5000 INJECTION INTRAVENOUS; SUBCUTANEOUS at 06:19

## 2023-03-30 RX ADMIN — POLYETHYLENE GLYCOL 3350 17 GRAM(S): 17 POWDER, FOR SOLUTION ORAL at 21:39

## 2023-03-30 RX ADMIN — Medication 1 MILLIGRAM(S): at 13:51

## 2023-03-30 RX ADMIN — Medication 100 MILLIGRAM(S): at 13:50

## 2023-03-30 RX ADMIN — HEPARIN SODIUM 5000 UNIT(S): 5000 INJECTION INTRAVENOUS; SUBCUTANEOUS at 13:51

## 2023-03-30 RX ADMIN — MIRTAZAPINE 7.5 MILLIGRAM(S): 45 TABLET, ORALLY DISINTEGRATING ORAL at 21:39

## 2023-03-30 RX ADMIN — TAMSULOSIN HYDROCHLORIDE 0.4 MILLIGRAM(S): 0.4 CAPSULE ORAL at 21:39

## 2023-03-30 RX ADMIN — SENNA PLUS 2 TABLET(S): 8.6 TABLET ORAL at 21:39

## 2023-03-30 RX ADMIN — PREGABALIN 1000 MICROGRAM(S): 225 CAPSULE ORAL at 13:51

## 2023-03-30 NOTE — PROGRESS NOTE ADULT - NUTRITIONAL ASSESSMENT
This patient has been assessed with a concern for Malnutrition and has been determined to have a diagnosis/diagnoses of Severe protein-calorie malnutrition.    This patient is being managed with:   Diet Regular-  Supplement Feeding Modality:  Oral  Ensure Plus High Protein Cans or Servings Per Day:  2       Frequency:  Daily  Entered: Mar 26 2023  7:37AM  
This patient has been assessed with a concern for Malnutrition and has been determined to have a diagnosis/diagnoses of Severe protein-calorie malnutrition.    This patient is being managed with:   Diet Regular-  Supplement Feeding Modality:  Oral  Ensure Clear Cans or Servings Per Day:  1       Frequency:  Three Times a day  Entered: Mar 24 2023  1:09PM  
This patient has been assessed with a concern for Malnutrition and has been determined to have a diagnosis/diagnoses of Severe protein-calorie malnutrition.    This patient is being managed with:   Diet Regular-  Supplement Feeding Modality:  Oral  Ensure Plus High Protein Cans or Servings Per Day:  2       Frequency:  Daily  Entered: Mar 26 2023  7:37AM  
This patient has been assessed with a concern for Malnutrition and has been determined to have a diagnosis/diagnoses of Severe protein-calorie malnutrition.    This patient is being managed with:   Diet Regular-  Supplement Feeding Modality:  Oral  Ensure Plus High Protein Cans or Servings Per Day:  2       Frequency:  Daily  Entered: Mar 26 2023  7:37AM

## 2023-03-30 NOTE — PROGRESS NOTE ADULT - PROBLEM SELECTOR PLAN 5
patient with CTAP with possible signs of acute cholecystitis  negative murphys  HIDA scan negative  no bilirubin elevation, jaundice  would monitor for now
dvt ppx: hsq  PT pending    d/w daughter Lauren in detail at the bedside today
patient with CTAP with possible signs of acute cholecystitis  negative murphys  HIDA scan negative, no fevers, elevation in WBC  no bilirubin elevation, jaundice  would monitor for now.
patient with CTAP with possible signs of acute cholecystitis  negative murphys  HIDA scan negative  no bilirubin elevation, jaundice  would monitor for now.
dvt ppx: hsq  PT pending
CTAP w/ acute cholecystitis on imaging, however pt w/o symptoms, neg kirby's sign  - surgery eval  - HIDA scan negative

## 2023-03-30 NOTE — PROGRESS NOTE ADULT - PROBLEM SELECTOR PROBLEM 5
R/O Cholecystitis
Discharge planning issues
Discharge planning issues
R/O Cholecystitis

## 2023-03-30 NOTE — PROGRESS NOTE ADULT - ASSESSMENT
86 yo m w no pmh, p/w generalized fatigue/weakness adm for FTT, found hypokalemic, vit b12 and folate deficient, CT w/ ?acute cholecystitis.  
88 yo m w no pmh, p/w generalized fatigue/weakness adm for FTT, found hypokalemic, vit b12 and folate deficient, CT w/ ?acute cholecystitis.
86 yo m w no pmh, p/w generalized fatigue/weakness adm for FTT, found hypokalemic, vit b12 and folate deficient, CT w/ ?acute cholecystitis.  
86 yo m w no pmh, p/w generalized fatigue/weakness adm for FTT, found hypokalemic, vit b12 and folate deficient, CT w/ ?acute cholecystitis.
88 yo m w no pmh, p/w generalized fatigue/weakness adm for FTT, found hypokalemic, vit b12 and folate deficient.

## 2023-03-30 NOTE — PROGRESS NOTE ADULT - PROBLEM SELECTOR PLAN 1
CTH shows chronic microangiopathic ischemic changes  vitamin b12 and folate deficiency noted on labs  pan scan - CT c/a/p neg for gross malignancy  no known hx of diarrhea/blood loss  - vit b12 1000 mcg sc qd while inpt,  can switch to po in dc. Folate 1mg qd started  - r/o pernicious anemia - check intrinsic factor ab, pending result  - nutrition consulted  - regular diet - added ensure tid  Due to anemia, wt loss and suspicion of PA, consulted GI but feel that anemia not profound enough for EGD, would perform if pt fails SLP and has dysphagia  ALP elevated, GGT wnl, passed S/S, regular diet started.

## 2023-03-30 NOTE — PROGRESS NOTE ADULT - PROBLEM SELECTOR PROBLEM 1
Adult failure to thrive

## 2023-03-30 NOTE — PROGRESS NOTE ADULT - PROVIDER SPECIALTY LIST ADULT
Internal Medicine
Hospitalist
Hospitalist
Internal Medicine
Hospitalist
Internal Medicine
Internal Medicine

## 2023-03-30 NOTE — PROGRESS NOTE ADULT - SUBJECTIVE AND OBJECTIVE BOX
[facility] Division of Hospital Medicine  Donovan Choe MD  Available via MS Teams      SUBJECTIVE / OVERNIGHT EVENTS:  No acute events overnight. Patient tolerating diet, and passed TOV.   No other complaints    ADDITIONAL REVIEW OF SYSTEMS:  negative, as above    MEDICATIONS  (STANDING):  cyanocobalamin Injectable 1000 MICROGram(s) SubCutaneous daily  folic acid 1 milliGRAM(s) Oral daily  heparin   Injectable 5000 Unit(s) SubCutaneous every 8 hours  multivitamin 1 Tablet(s) Oral daily  polyethylene glycol 3350 17 Gram(s) Oral at bedtime  senna 2 Tablet(s) Oral at bedtime  tamsulosin 0.4 milliGRAM(s) Oral at bedtime  thiamine 100 milliGRAM(s) Oral daily    MEDICATIONS  (PRN):  acetaminophen     Tablet .. 650 milliGRAM(s) Oral every 6 hours PRN Temp greater or equal to 38C (100.4F), Mild Pain (1 - 3)  aluminum hydroxide/magnesium hydroxide/simethicone Suspension 30 milliLiter(s) Oral every 4 hours PRN Dyspepsia  melatonin 3 milliGRAM(s) Oral at bedtime PRN Insomnia  ondansetron Injectable 4 milliGRAM(s) IV Push every 8 hours PRN Nausea and/or Vomiting      I&O's Summary    29 Mar 2023 07:01  -  30 Mar 2023 07:00  --------------------------------------------------------  IN: 200 mL / OUT: 2510 mL / NET: -2310 mL        PHYSICAL EXAM:  Vital Signs Last 24 Hrs  T(C): 36.8 (30 Mar 2023 04:58), Max: 36.8 (30 Mar 2023 04:58)  T(F): 98.3 (30 Mar 2023 04:58), Max: 98.3 (30 Mar 2023 04:58)  HR: 58 (30 Mar 2023 04:58) (58 - 62)  BP: 121/65 (30 Mar 2023 04:58) (121/65 - 133/67)  BP(mean): --  RR: 17 (30 Mar 2023 04:58) (17 - 18)  SpO2: 96% (30 Mar 2023 04:58) (94% - 96%)    Parameters below as of 30 Mar 2023 04:58  Patient On (Oxygen Delivery Method): room air      GENERAL:  Well appearing,  NAD, hard of hearing  HEAD:  NCAT  EYES: conjunctiva clear  NECK: Supple, No JVD  CHEST/LUNG: CTA B/L. No w/r/r.  HEART: Reg rate. Normal S1, S2. No m/r/g.   ABDOMEN: Soft, no RUQ ttp, negative kirby's  : dark urine in fuller  EXTREMITIES:  2+ Peripheral Pulses, No clubbing, cyanosis  PSYCH: Appropriate affect  SKIN: No rashes or lesions  LABS:                        10.2   4.41  )-----------( 208      ( 29 Mar 2023 07:15 )             30.0     03-29    138  |  100  |  8   ----------------------------<  104<H>  4.0   |  32<H>  |  1.06    Ca    8.2<L>      29 Mar 2023 19:33  Phos  2.2     03-29  Mg     1.9     03-29                COVID-19 PCR: NotDetec (28 Mar 2023 22:34)      RADIOLOGY & ADDITIONAL TESTS:  ekg, labs, micro imaging personally reviewed      COORDINATION OF CARE:  care discussed and coordinated with consultants.

## 2023-03-30 NOTE — PROGRESS NOTE ADULT - PROBLEM SELECTOR PLAN 3
UA sent, moderate blood and many abnormalities, concentrated,  review of imaging shows prostatomegaly and moderate bladder wall thickening  concern for prostate etiology, infection vs malignancy  PSA 5.56. SPEP and UPEP, pending  UA negative for bacteria  started on tamsulosin.

## 2023-03-30 NOTE — PROGRESS NOTE ADULT - REASON FOR ADMISSION
generalized fatigue/weakness

## 2023-03-31 ENCOUNTER — TRANSCRIPTION ENCOUNTER (OUTPATIENT)
Age: 88
End: 2023-03-31

## 2023-03-31 VITALS
RESPIRATION RATE: 18 BRPM | DIASTOLIC BLOOD PRESSURE: 75 MMHG | OXYGEN SATURATION: 96 % | HEART RATE: 74 BPM | TEMPERATURE: 98 F | SYSTOLIC BLOOD PRESSURE: 113 MMHG

## 2023-03-31 LAB
% GAMMA, URINE: 15 % — SIGNIFICANT CHANGE UP
ALBUMIN 24H MFR UR ELPH: 19.2 % — SIGNIFICANT CHANGE UP
ALPHA1 GLOB 24H MFR UR ELPH: 31.6 % — SIGNIFICANT CHANGE UP
ALPHA2 GLOB 24H MFR UR ELPH: 18 % — SIGNIFICANT CHANGE UP
B-GLOBULIN 24H MFR UR ELPH: 16.2 % — SIGNIFICANT CHANGE UP
COLLECT DURATION TIME UR: 24 HR — SIGNIFICANT CHANGE UP
INTERPRETATION 24H UR IFE-IMP: SIGNIFICANT CHANGE UP
INTERPRETATION 24H UR IFE-IMP: SIGNIFICANT CHANGE UP
M PROTEIN 24H UR ELPH-MRATE: 3 MG/DL — SIGNIFICANT CHANGE UP
M PROTEIN 24H UR ELPH-MRATE: 7.5 MG/24HR — HIGH (ref 0–0)
OB PNL STL: NEGATIVE — SIGNIFICANT CHANGE UP
PROT PATTERN 24H UR ELPH-IMP: SIGNIFICANT CHANGE UP
PROTEIN QUANT CALC, URINE: 162 MG/24 H — HIGH (ref 50–100)
TOTAL VOLUME - 24 HOUR: 250 ML — SIGNIFICANT CHANGE UP
URINE CREATININE CALCULATION: 0.6 G/24 H — LOW (ref 1–2)

## 2023-03-31 PROCEDURE — 82435 ASSAY OF BLOOD CHLORIDE: CPT

## 2023-03-31 PROCEDURE — 72125 CT NECK SPINE W/O DYE: CPT

## 2023-03-31 PROCEDURE — 84100 ASSAY OF PHOSPHORUS: CPT

## 2023-03-31 PROCEDURE — 82803 BLOOD GASES ANY COMBINATION: CPT

## 2023-03-31 PROCEDURE — C8929: CPT

## 2023-03-31 PROCEDURE — 83036 HEMOGLOBIN GLYCOSYLATED A1C: CPT

## 2023-03-31 PROCEDURE — 87324 CLOSTRIDIUM AG IA: CPT

## 2023-03-31 PROCEDURE — 72125 CT NECK SPINE W/O DYE: CPT | Mod: 26

## 2023-03-31 PROCEDURE — 85730 THROMBOPLASTIN TIME PARTIAL: CPT

## 2023-03-31 PROCEDURE — 83690 ASSAY OF LIPASE: CPT

## 2023-03-31 PROCEDURE — 87637 SARSCOV2&INF A&B&RSV AMP PRB: CPT

## 2023-03-31 PROCEDURE — 99285 EMERGENCY DEPT VISIT HI MDM: CPT | Mod: 25

## 2023-03-31 PROCEDURE — 71260 CT THORAX DX C+: CPT

## 2023-03-31 PROCEDURE — 86140 C-REACTIVE PROTEIN: CPT

## 2023-03-31 PROCEDURE — G0103: CPT

## 2023-03-31 PROCEDURE — 73502 X-RAY EXAM HIP UNI 2-3 VIEWS: CPT | Mod: 26,LT

## 2023-03-31 PROCEDURE — 78227 HEPATOBIL SYST IMAGE W/DRUG: CPT

## 2023-03-31 PROCEDURE — 86780 TREPONEMA PALLIDUM: CPT

## 2023-03-31 PROCEDURE — 84166 PROTEIN E-PHORESIS/URINE/CSF: CPT

## 2023-03-31 PROCEDURE — 84155 ASSAY OF PROTEIN SERUM: CPT

## 2023-03-31 PROCEDURE — 85018 HEMOGLOBIN: CPT

## 2023-03-31 PROCEDURE — 85025 COMPLETE CBC W/AUTO DIFF WBC: CPT

## 2023-03-31 PROCEDURE — 81001 URINALYSIS AUTO W/SCOPE: CPT

## 2023-03-31 PROCEDURE — 87507 IADNA-DNA/RNA PROBE TQ 12-25: CPT

## 2023-03-31 PROCEDURE — 70450 CT HEAD/BRAIN W/O DYE: CPT

## 2023-03-31 PROCEDURE — 73502 X-RAY EXAM HIP UNI 2-3 VIEWS: CPT

## 2023-03-31 PROCEDURE — 76705 ECHO EXAM OF ABDOMEN: CPT

## 2023-03-31 PROCEDURE — 84484 ASSAY OF TROPONIN QUANT: CPT

## 2023-03-31 PROCEDURE — 83605 ASSAY OF LACTIC ACID: CPT

## 2023-03-31 PROCEDURE — 82947 ASSAY GLUCOSE BLOOD QUANT: CPT

## 2023-03-31 PROCEDURE — 82330 ASSAY OF CALCIUM: CPT

## 2023-03-31 PROCEDURE — U0005: CPT

## 2023-03-31 PROCEDURE — 71045 X-RAY EXAM CHEST 1 VIEW: CPT

## 2023-03-31 PROCEDURE — 72170 X-RAY EXAM OF PELVIS: CPT

## 2023-03-31 PROCEDURE — 86334 IMMUNOFIX E-PHORESIS SERUM: CPT

## 2023-03-31 PROCEDURE — 74177 CT ABD & PELVIS W/CONTRAST: CPT

## 2023-03-31 PROCEDURE — 36415 COLL VENOUS BLD VENIPUNCTURE: CPT

## 2023-03-31 PROCEDURE — 97162 PT EVAL MOD COMPLEX 30 MIN: CPT

## 2023-03-31 PROCEDURE — 99239 HOSP IP/OBS DSCHRG MGMT >30: CPT

## 2023-03-31 PROCEDURE — 82272 OCCULT BLD FECES 1-3 TESTS: CPT

## 2023-03-31 PROCEDURE — 84165 PROTEIN E-PHORESIS SERUM: CPT

## 2023-03-31 PROCEDURE — 82746 ASSAY OF FOLIC ACID SERUM: CPT

## 2023-03-31 PROCEDURE — 93005 ELECTROCARDIOGRAM TRACING: CPT

## 2023-03-31 PROCEDURE — 82977 ASSAY OF GGT: CPT

## 2023-03-31 PROCEDURE — 80061 LIPID PANEL: CPT

## 2023-03-31 PROCEDURE — 97166 OT EVAL MOD COMPLEX 45 MIN: CPT

## 2023-03-31 PROCEDURE — 84295 ASSAY OF SERUM SODIUM: CPT

## 2023-03-31 PROCEDURE — 86340 INTRINSIC FACTOR ANTIBODY: CPT

## 2023-03-31 PROCEDURE — 80053 COMPREHEN METABOLIC PANEL: CPT

## 2023-03-31 PROCEDURE — U0003: CPT

## 2023-03-31 PROCEDURE — 84132 ASSAY OF SERUM POTASSIUM: CPT

## 2023-03-31 PROCEDURE — 84480 ASSAY TRIIODOTHYRONINE (T3): CPT

## 2023-03-31 PROCEDURE — 97116 GAIT TRAINING THERAPY: CPT

## 2023-03-31 PROCEDURE — 84436 ASSAY OF TOTAL THYROXINE: CPT

## 2023-03-31 PROCEDURE — 70450 CT HEAD/BRAIN W/O DYE: CPT | Mod: 26

## 2023-03-31 PROCEDURE — 85027 COMPLETE CBC AUTOMATED: CPT

## 2023-03-31 PROCEDURE — 85014 HEMATOCRIT: CPT

## 2023-03-31 PROCEDURE — 85652 RBC SED RATE AUTOMATED: CPT

## 2023-03-31 PROCEDURE — 97530 THERAPEUTIC ACTIVITIES: CPT

## 2023-03-31 PROCEDURE — 80048 BASIC METABOLIC PNL TOTAL CA: CPT

## 2023-03-31 PROCEDURE — A9537: CPT

## 2023-03-31 PROCEDURE — 92610 EVALUATE SWALLOWING FUNCTION: CPT

## 2023-03-31 PROCEDURE — 87086 URINE CULTURE/COLONY COUNT: CPT

## 2023-03-31 PROCEDURE — 82607 VITAMIN B-12: CPT

## 2023-03-31 PROCEDURE — 84443 ASSAY THYROID STIM HORMONE: CPT

## 2023-03-31 PROCEDURE — 87449 NOS EACH ORGANISM AG IA: CPT

## 2023-03-31 PROCEDURE — 83735 ASSAY OF MAGNESIUM: CPT

## 2023-03-31 PROCEDURE — 85610 PROTHROMBIN TIME: CPT

## 2023-03-31 RX ORDER — THIAMINE MONONITRATE (VIT B1) 100 MG
1 TABLET ORAL
Qty: 0 | Refills: 0 | DISCHARGE
Start: 2023-03-31

## 2023-03-31 RX ORDER — PANTOPRAZOLE SODIUM 20 MG/1
1 TABLET, DELAYED RELEASE ORAL
Qty: 0 | Refills: 0 | DISCHARGE
Start: 2023-03-31

## 2023-03-31 RX ORDER — MIRTAZAPINE 45 MG/1
1 TABLET, ORALLY DISINTEGRATING ORAL
Qty: 0 | Refills: 0 | DISCHARGE
Start: 2023-03-31

## 2023-03-31 RX ORDER — PANTOPRAZOLE SODIUM 20 MG/1
40 TABLET, DELAYED RELEASE ORAL
Refills: 0 | Status: DISCONTINUED | OUTPATIENT
Start: 2023-03-31 | End: 2023-03-31

## 2023-03-31 RX ORDER — SENNA PLUS 8.6 MG/1
2 TABLET ORAL
Qty: 0 | Refills: 0 | DISCHARGE
Start: 2023-03-31

## 2023-03-31 RX ORDER — TAMSULOSIN HYDROCHLORIDE 0.4 MG/1
1 CAPSULE ORAL
Qty: 0 | Refills: 0 | DISCHARGE
Start: 2023-03-31

## 2023-03-31 RX ORDER — ACETAMINOPHEN 500 MG
2 TABLET ORAL
Qty: 0 | Refills: 0 | DISCHARGE
Start: 2023-03-31

## 2023-03-31 RX ORDER — FOLIC ACID 0.8 MG
1 TABLET ORAL
Qty: 0 | Refills: 0 | DISCHARGE
Start: 2023-03-31

## 2023-03-31 RX ORDER — ACETAMINOPHEN 500 MG
1000 TABLET ORAL ONCE
Refills: 0 | Status: COMPLETED | OUTPATIENT
Start: 2023-03-31 | End: 2023-03-31

## 2023-03-31 RX ORDER — POLYETHYLENE GLYCOL 3350 17 G/17G
17 POWDER, FOR SOLUTION ORAL
Qty: 0 | Refills: 0 | DISCHARGE
Start: 2023-03-31

## 2023-03-31 RX ADMIN — PANTOPRAZOLE SODIUM 40 MILLIGRAM(S): 20 TABLET, DELAYED RELEASE ORAL at 11:53

## 2023-03-31 RX ADMIN — PREGABALIN 1000 MICROGRAM(S): 225 CAPSULE ORAL at 11:45

## 2023-03-31 RX ADMIN — Medication 100 MILLIGRAM(S): at 11:46

## 2023-03-31 RX ADMIN — Medication 1 TABLET(S): at 11:45

## 2023-03-31 RX ADMIN — Medication 400 MILLIGRAM(S): at 06:44

## 2023-03-31 RX ADMIN — Medication 1 MILLIGRAM(S): at 11:45

## 2023-03-31 RX ADMIN — Medication 1000 MILLIGRAM(S): at 07:15

## 2023-03-31 NOTE — DISCHARGE NOTE PROVIDER - NSDCCPCAREPLAN_GEN_ALL_CORE_FT
PRINCIPAL DISCHARGE DIAGNOSIS  Diagnosis: Adult failure to thrive  Assessment and Plan of Treatment: You had a CT of your head which showed chronic microangiopathic ischemic changes  You were noted to have vitamin b12 and folate deficiency; continue supplementation   You had a CT of your chest/abdomen and pelvis that was negative for gross malignancy  GI consulted due to weight loss and suspicion of perncious anemia, however anemia not profound enough for endoscopy  Continue regular diet with ensure   Follow up with your PCP within one week for further outpatient management      SECONDARY DISCHARGE DIAGNOSES  Diagnosis: Hypokalemia  Assessment and Plan of Treatment: You potassium level was low during admission  You were repleted and levels have resolved    Diagnosis: Dark brown urine  Assessment and Plan of Treatment: During admission you had dark brown urine, possible due to prostate eitology   Continue flomax as prescribed    Diagnosis: Diarrhea  Assessment and Plan of Treatment: You were having diarrhea during admission   Resolved

## 2023-03-31 NOTE — DISCHARGE NOTE PROVIDER - HOSPITAL COURSE
88 yo m w no pmh, p/w generalized fatigue/weakness/malaise for the last month. patient has been having poor po intake with weight loss during this time. now so weak, that he feels as though he is unable to perform adls; in addition, he has incurred multiple falls over this time period as well. family and patient grew concerned so present to [facility] er for further evaluation.     Problem/Plan - 1:  ·  Problem: Adult failure to thrive.   ·  Plan: CTH shows chronic microangiopathic ischemic changes  vitamin b12 and folate deficiency noted on labs  pan scan - CT c/a/p neg for gross malignancy  no known hx of diarrhea/blood loss  - vit b12 1000 mcg sc qd while inpt,  can switch to po in dc. Folate 1mg qd started  - r/o pernicious anemia - check intrinsic factor ab, pending result  - nutrition consulted  - regular diet - added ensure tid  Due to anemia, wt loss and suspicion of PA, consulted GI but feel that anemia not profound enough for EGD, would perform if pt fails SLP and has dysphagia  ALP elevated, GGT wnl, passed S/S, regular diet started.     Problem/Plan - 2:  ·  Problem: Hypokalemia.   ·  Plan: hypokalemic to 3.2  supplement PRN.     Problem/Plan - 3:  ·  Problem: Dark brown urine.   ·  Plan: UA sent, moderate blood and many abnormalities, concentrated,  review of imaging shows prostatomegaly and moderate bladder wall thickening  concern for prostate etiology, infection vs malignancy  PSA 5.56. SPEP and UPEP, pending  UA negative for bacteria  started on tamsulosin.     Problem/Plan - 4:  ·  Problem: Diarrhea.   ·  Plan: GI PCR negative  resolved.     Problem/Plan - 5:  ·  Problem: R/O Cholecystitis.   ·  Plan: patient with CTAP with possible signs of acute cholecystitis  negative murphys  HIDA scan negative  no bilirubin elevation, jaundice  would monitor for now.    Discharge/dispo/med rec discussed with attending Dr. Choe. Patient is medically cleared for discharge with outpatient follow up  86 yo m w no pmh, p/w generalized fatigue/weakness/malaise for the last month. patient has been having poor po intake with weight loss during this time. now so weak, that he feels as though he is unable to perform adls; in addition, he has incurred multiple falls over this time period as well. family and patient grew concerned so present to [facility] er for further evaluation.     Problem/Plan - 1:  ·  Problem: Adult failure to thrive.   ·  Plan: CTH shows chronic microangiopathic ischemic changes  vitamin b12 and folate deficiency noted on labs  pan scan - CT c/a/p neg for gross malignancy  no known hx of diarrhea/blood loss  - vit b12 1000 mcg sc qd while inpt, Folate 1mg qd started  - r/o pernicious anemia - check intrinsic factor ab, pending result  - nutrition consulted  - regular diet - added ensure tid  Due to anemia, wt loss and suspicion of PA, consulted GI but feel that anemia not profound enough for EGD, would perform if pt fails SLP and has dysphagia  ALP elevated, GGT wnl, passed S/S, regular diet started.     Problem/Plan - 2:  ·  Problem: Hypokalemia.   ·  Plan: hypokalemic to 3.2  supplement PRN.     Problem/Plan - 3:  ·  Problem: Dark brown urine.   ·  Plan: UA sent, moderate blood and many abnormalities, concentrated,  review of imaging shows prostatomegaly and moderate bladder wall thickening  concern for prostate etiology, infection vs malignancy  PSA 5.56. SPEP and UPEP, pending  UA negative for bacteria  started on tamsulosin.     Problem/Plan - 4:  ·  Problem: Diarrhea.   ·  Plan: GI PCR negative  resolved.     Problem/Plan - 5:  ·  Problem: R/O Cholecystitis.   ·  Plan: patient with CTAP with possible signs of acute cholecystitis  negative murphys  HIDA scan negative  no bilirubin elevation, jaundice  would monitor for now.    Discharge/dispo/med rec discussed with attending Dr. Choe. Patient is medically cleared for discharge with outpatient follow up

## 2023-03-31 NOTE — DISCHARGE NOTE PROVIDER - ATTENDING DISCHARGE PHYSICAL EXAMINATION:
T(C): 36.7 (03-31-23 @ 08:00), Max: 37.1 (03-31-23 @ 02:21)  HR: 64 (03-31-23 @ 08:00) (60 - 76)  BP: 110/66 (03-31-23 @ 08:00) (101/62 - 147/77)  RR: 18 (03-31-23 @ 08:00) (18 - 18)  SpO2: 97% (03-31-23 @ 08:00) (94% - 98%)  General: NAD, comfortable  Eyes: no conjunctival erythema  ENT: MMM  Neck: Neck supple, No JVD  Respiratory: CTA B/L, No wheezing, rales, rhonchi  CV: RRR no murmurs  Abdominal: Soft, NT, ND +BS  MSK: no focal weakness  Extremities: No edema, 2+ peripheral pulses  Neurology: A&Ox3, nonfocal, WALLACE x 4  Skin: No Rashes, Hematoma, Ecchymosis  Psych: Calm and appropriate

## 2023-03-31 NOTE — PROVIDER CONTACT NOTE (FALL NOTIFICATION) - ASSESSMENT
Patient found lying on his left side with a contusion on left forehead.   BP: 147/77  Temp: 98.8  ND: 75  O2: 97  RR:18

## 2023-03-31 NOTE — DISCHARGE NOTE PROVIDER - NSDCFUADDINST_GEN_ALL_CORE_FT
Please follow up with your PCP or with the [facility] within one week for further outpatient management

## 2023-03-31 NOTE — DISCHARGE NOTE PROVIDER - NSDCMRMEDTOKEN_GEN_ALL_CORE_FT
acetaminophen 325 mg oral tablet: 2 tab(s) orally every 6 hours As needed Temp greater or equal to 38C (100.4F), Mild Pain (1 - 3)  folic acid 1 mg oral tablet: 1 tab(s) orally once a day  mirtazapine 7.5 mg oral tablet: 1 tab(s) orally once a day (at bedtime)  Multiple Vitamins oral tablet: 1 tab(s) orally once a day  pantoprazole 40 mg oral delayed release tablet: 1 tab(s) orally once a day (before a meal)  polyethylene glycol 3350 oral powder for reconstitution: 17 gram(s) orally once a day (at bedtime)  senna leaf extract oral tablet: 2 tab(s) orally once a day (at bedtime)  tamsulosin 0.4 mg oral capsule: 1 cap(s) orally once a day (at bedtime)  thiamine 100 mg oral tablet: 1 tab(s) orally once a day

## 2023-03-31 NOTE — DISCHARGE NOTE PROVIDER - NSFOLLOWUPCLINICS_GEN_ALL_ED_FT
[facility] - Primary Care  Primary Care  [address] [place] [address]  Phone: [phone]  Fax:

## 2023-03-31 NOTE — DISCHARGE NOTE NURSING/CASE MANAGEMENT/SOCIAL WORK - PATIENT PORTAL LINK FT
You can access the FollowMyHealth Patient Portal offered by [facility] by registering at the following website: [email or web address]. By joining [facility]’s FollowMyHealth portal, you will also be able to view your health information using other applications (apps) compatible with our system.

## 2023-03-31 NOTE — DISCHARGE NOTE PROVIDER - CARE PROVIDER_API CALL
Sayra Powell)  Internal Medicine  [address], [address]  [place] [address]  Phone: [phone]  Fax: [phone]  Follow Up Time:

## 2023-03-31 NOTE — CHART NOTE - NSCHARTNOTEFT_GEN_A_CORE
Nutrition Follow Up Note  Patient seen for: 3-Day Calorie Count Results/Malnutrition Follow up     Chart reviewed, events noted. "88 yo m w no pmh, p/w generalized fatigue/weakness adm for FTT, found hypokalemic, vit b12 and folate deficient."     Source: [] Patient       [x] Current Medical Record      [X] RN        [] Family at bedside       [] Other:    -If unable to interview patient: [] Trach/Vent/BiPAP  [X] Disoriented/confused/inappropriate to interview    Diet Order:   Diet, Regular:   Supplement Feeding Modality:  Oral  Ensure Plus High Protein Cans or Servings Per Day:  2       Frequency:  Daily (23)    - Is current order appropriate/adequate? [X] Yes  []  No:   per calorie count assessment:     Day 1(3/28/23): 1188 calories,  55.625 g/pro     Day 2(3/29/23):  1571.95 calories, 69.351 g/pro     Day 3 (3/30/23) : 981.5 calories, 29.88 g/pro (nothing documented for dinner on 3-Day Calorie Count Sheet)     --On average pt consumed 1247.15 calories (64% of estimated calorie needs) and 51.6 g/pro (66% estimated protein needs) in a day based on average PO intake of meals during 3-Day Calore Count  -- Pt also ordered for oral nutritional supplement ensure plus high protein 2x/day  which provides 700 kcal, 40 gm pro, if consumed 100%.   -- 3-Day Calore Count results + 100% consumption of oral nutritional supplement as prescribed would be providing 1947.16 kcal( ~100% of estimate calorie needs) and 91.6 gm pro (~100% of estimate calorie needs)      Nutrition-related concerns:      - s/p unwitnessed fall  early morning on 3/31/23      - Ordered for mirtazapine which may help stimulate appetite       - As per care coordination note 3/31/23, "waiting discharge clearance from medical team."        GI:  Last BM 3/27/23  Bowel Regimen? [X] Yes   [] No      Weights: Daily Weight in k.6 (-), Weight in k.6 (-)    Nutritionally Pertinent MEDICATIONS  (STANDING):  cyanocobalamin Injectable 1000 MICROGram(s) SubCutaneous daily  folic acid 1 milliGRAM(s) Oral daily  mirtazapine 7.5 milliGRAM(s) Oral at bedtime  multivitamin 1 Tablet(s) Oral daily  pantoprazole    Tablet 40 milliGRAM(s) Oral before breakfast  polyethylene glycol 3350 17 Gram(s) Oral at bedtime  senna 2 Tablet(s) Oral at bedtime  tamsulosin 0.4 milliGRAM(s) Oral at bedtime  thiamine 100 milliGRAM(s) Oral daily    MEDICATIONS  (PRN):  acetaminophen     Tablet .. 650 milliGRAM(s) Oral every 6 hours PRN Temp greater or equal to 38C (100.4F), Mild Pain (1 - 3)  aluminum hydroxide/magnesium hydroxide/simethicone Suspension 30 milliLiter(s) Oral every 4 hours PRN Dyspepsia  melatonin 3 milliGRAM(s) Oral at bedtime PRN Insomnia  ondansetron Injectable 4 milliGRAM(s) IV Push every 8 hours PRN Nausea and/or Vomiting      Pertinent Labs:   A1C with Estimated Average Glucose Result: 5.6 % (23 @ 07:02)      Skin per nursing documentation: No pressure injuries noted per flow sheets   Edema: +1 edema to left and right leg    Estimated Needs:   [] no change since previous assessment  [X] recalculated:  Based on Most recent weight of 77.6 kg (03-29)  25-30 kcal/kg= 3391-4584 kcal  1.0-1.2 gm/kg= 77.6-93.12 gm/kg pro     Previous Nutrition Diagnosis: Severe acute on chronic malnutrition   Nutrition Diagnosis is: [X] ongoing  [] resolved [] not applicable     New Nutrition Diagnosis: [X] Not applicable    Nutrition Care Plan:  [X] In Progress- addressed with diet order, PO encouragement and nutritional supplements   [] Achieved  [] Not applicable    Nutrition Interventions:     Education Provided:       [] Yes:  [X] No: Pt not a candidate for education        Recommendations:        1. Continue no therapeutic dietary restrictions. Defer diet/texture modification to medical team/SLP as indicated     2. Continue Ensure plus High Protein 2x/day to augment PO intakes of meals    3. Continue Multivitamin, Folic acid, thiamine, and cyanocobalamin micronutrient supplementation    4. Encourage PO intakes. Honor food preferences as appropriate and available.     5. Nursing Staff to provide assistance during meal times as warranted.     Monitoring and Evaluation:   Continue to monitor nutritional intake, tolerance to diet prescription, weights, labs, skin integrity    RD remains available upon request and will follow up per protocol  Adrianna Jameson MS,RDN, CDN, Pager # [phone] or TEAMS
Patient triggered for nutrition consult for 3-Day calorie count (3/28/23-3/29/23).Calorie Count sheet observed in patient's assigned room, nursing documentation has been initiated. Will follow up with results upon completion.     Adrianna Jameson MS,RDN,CDN Pager #[phone] or TEAMS
MEDICINE NP    CHANDNIDARRELL  87y Male      Event Summary:   Called by RN to evaluate patient for unwitnessed fall, noted on left side  with left forehead swelling.    Patient seen and assessed at bedside, AOX1-2, very [place], difficult to communicate. Unable to contribute.     Vital Signs Last 24 Hrs  T(C): 36.9 (31 Mar 2023 03:37), Max: 37.1 (31 Mar 2023 02:21)  T(F): 98.5 (31 Mar 2023 03:37), Max: 98.8 (31 Mar 2023 02:21)  HR: 68 (31 Mar 2023 03:37) (58 - 76)  BP: 135/72 (31 Mar 2023 03:37) (101/62 - 147/77)  BP(mean): --  RR: 18 (31 Mar 2023 03:37) (17 - 18)  SpO2: 95% (31 Mar 2023 03:37) (94% - 98%)    Parameters below as of 31 Mar 2023 03:37  Patient On (Oxygen Delivery Method): room air        > Physical Assessment:  General: AOX1-2,  very [place], follows commands.  Unable to contribute.   Neuro: Pupils 2mm PERRL.    Head : +right forehead swelling.   CV: +S1S2, RRR.  No peripheral edema  Respiratory: Even, unlabored.  CTA B/L.    Abdomen:  +BS.  Soft, NT, ND.  No palpable mass  MSK: WALLACE x4. No spinous process ttp or gross deformity.    Skin: Warm and Dry.  No open tears or laceration     >> from: CT Head No Cont (03.31.23 @ 03:18) >  ******PRELIMINARY REPORT******   INTERPRETATION:  No acute intracranial hem, mass effect, or midline shift  Small left front extracranial subcutaneous hematoma  < end of copied text >    >>PELVIS &  Xray Hip 2-3 Views, Left (03.31.23 @ 03:36) >  ****PRELIMINARY REPORT******   INTERPRETATION:  No acute fracture or dislocation. F/u full report in AM.  < end of copied text >        > Assessment & Plan:  HPI:  88 yo m w no PMH  p/w generalized fatigue/weakness/malaise for the last month.  Patient has been having poor po intake with weight loss during this time, now so weak, that he feels as though he is unable to perform adls; in addition, he has incurred multiple falls over this time period as well. family and patient grew concerned so present to [facility] er for further evaluation. (24 Mar 2023 03:02).  Admitted w/ Weakness/FTT- CTH/C-spine negative; on IVF.  Now with Fall & Right forehead hematoma    1. Fall w/ Right Forehead Hematoma    -STAT CTH, Left Hip, Pelvis - **prelim neg.   Follow-up final report  -F/u CT C- Spine -Pending  -NeuroChecks q4hr  -Bed Alarm  -Fall  & Safety Precautions  -Will hold HSQ vte ppx for now  -ICE pack to area  -Daughter, Lauren Wallace, made aware of fall and plan above  -D/w LOUIS, RICHAR Salazar and requests to d/c HSQ.    -Will continue to closely monitor patient/vitals  -Will endorse to Day Provider, and Attending to follow in AM      JAYCEE Nunez-BC  Medicine Department  #[phone]

## 2023-03-31 NOTE — DISCHARGE NOTE PROVIDER - DETAILS OF MALNUTRITION DIAGNOSIS/DIAGNOSES
This patient has been assessed with a concern for Malnutrition and was treated during this hospitalization for the following Nutrition diagnosis/diagnoses:     -  03/25/2023: Severe protein-calorie malnutrition

## 2023-03-31 NOTE — DISCHARGE NOTE NURSING/CASE MANAGEMENT/SOCIAL WORK - NSDCPEFALRISK_GEN_ALL_CORE
For information on Fall & Injury Prevention, visit: [email or web address] OR  [email or web address] OR  https://www.cdc.gov/steadi/patient.html

## 2023-05-25 ENCOUNTER — APPOINTMENT (OUTPATIENT)
Dept: UROLOGY | Facility: CLINIC | Age: 88
End: 2023-05-25

## 2023-06-02 ENCOUNTER — APPOINTMENT (OUTPATIENT)
Dept: UROLOGY | Facility: CLINIC | Age: 88
End: 2023-06-02

## 2023-06-13 ENCOUNTER — APPOINTMENT (OUTPATIENT)
Dept: UROLOGY | Facility: CLINIC | Age: 88
End: 2023-06-13
Payer: MEDICARE

## 2023-06-13 VITALS
TEMPERATURE: 97.2 F | SYSTOLIC BLOOD PRESSURE: 168 MMHG | HEART RATE: 90 BPM | DIASTOLIC BLOOD PRESSURE: 80 MMHG | OXYGEN SATURATION: 97 % | BODY MASS INDEX: 26.96 KG/M2 | WEIGHT: 182 LBS | HEIGHT: 69 IN

## 2023-06-13 DIAGNOSIS — Z87.39 PERSONAL HISTORY OF OTHER DISEASES OF THE MUSCULOSKELETAL SYSTEM AND CONNECTIVE TISSUE: ICD-10-CM

## 2023-06-13 DIAGNOSIS — Z86.59 PERSONAL HISTORY OF OTHER MENTAL AND BEHAVIORAL DISORDERS: ICD-10-CM

## 2023-06-13 DIAGNOSIS — Z86.2 PERSONAL HISTORY OF DISEASES OF THE BLOOD AND BLOOD-FORMING ORGANS AND CERTAIN DISORDERS INVOLVING THE IMMUNE MECHANISM: ICD-10-CM

## 2023-06-13 DIAGNOSIS — Z83.3 FAMILY HISTORY OF DIABETES MELLITUS: ICD-10-CM

## 2023-06-13 DIAGNOSIS — H91.90 UNSPECIFIED HEARING LOSS, UNSPECIFIED EAR: ICD-10-CM

## 2023-06-13 DIAGNOSIS — Z82.49 FAMILY HISTORY OF ISCHEMIC HEART DISEASE AND OTHER DISEASES OF THE CIRCULATORY SYSTEM: ICD-10-CM

## 2023-06-13 PROCEDURE — 51798 US URINE CAPACITY MEASURE: CPT

## 2023-06-13 PROCEDURE — 99204 OFFICE O/P NEW MOD 45 MIN: CPT

## 2023-06-13 RX ORDER — PANTOPRAZOLE 40 MG/1
40 TABLET, DELAYED RELEASE ORAL
Refills: 0 | Status: ACTIVE | COMMUNITY

## 2023-06-13 RX ORDER — FINASTERIDE 5 MG/1
5 TABLET, FILM COATED ORAL
Refills: 0 | Status: ACTIVE | COMMUNITY

## 2023-06-13 RX ORDER — FOLIC ACID 1 MG/1
1 TABLET ORAL
Refills: 0 | Status: ACTIVE | COMMUNITY

## 2023-06-13 NOTE — ASSESSMENT
[FreeTextEntry1] : Mr. Wallace presents for initial evaluation of urinary retention. He denies any voiding complaints but has a history of retention and incomplete emptying during a recent hospitalization. He was discharged from rehab without a catheter. PVR: >600 cc.\par \par His daughter is present as an independent historian. Hospital notes, labs, and imaging reviewed. Cr: 1.25, not grossly elevated. CT A/P shows normal upper tracts. No renal atrophy, hydro, masses, or stones. Prostate measures ~60 grams. \par \par 18F coude catheter placed, 1L clear yellow urine drained. Catheter care provided. All questions answered\par \par Recommendations\par -continue Dasilva to gravity\par -d/c finasteride\par -Ucx\par -RTC 1 mo for Dasilva change - will review possible UDS for evaluation of bladder function vs. discussions for SPT at that time \par  \par

## 2023-06-13 NOTE — HISTORY OF PRESENT ILLNESS
[Urinary Retention] : urinary retention [Weak Stream] : weak stream [Intermittency] : intermittency [FreeTextEntry1] : 87M presents for initial evaluation of enlarged prostate  \par \par PMH significant for: BPH, dementia, herniated disc, anemia\par PSH significant for: nothing\par Significant meds: finasteride, folic acid, pantoprazole\par  \par Seen in ED for fatigue/malaise/weight loss \par CT- moderately distended bladder, enlarged prostate  \par PSA 4/21: 2/88, UCx 5/10: +klebsiella, Cr 5/9: 1.25\par CT (3/2023) reviewed. No hydro, atrophy, or renal masses. Prostate ~60 g, bladder distended \par In rehab was told he was retaining urine and was catheterized\par Is now home and has been urinating at least 1L per day\par  \par Patient reports months-long history of incomplete emptying\par Reports moderate level of distress \par Associated nothing \par Previously treated with nothing\par IPSS:13, QoL:3\par \par Daytime Frequency: 3-4\par Nighttime Frequency: 1-2\par Pads per day: 0\par Straining to void: no\par Intermittency: yes\par Dribbling: no\par Subjective Incomplete Emptying: no\par UTIs this past year: 1\par \par Daily Fluid Total: 3 16oz\par Daily Caffeine Total: 16oz\par  \par Neurologic Hx, Vision or Balance changes: no\par Erections: n/a\par Importance of maintaining ejaculatory ability: n/a\par

## 2023-06-13 NOTE — PHYSICAL EXAM
[General Appearance - Well Developed] : well developed [General Appearance - Well Nourished] : well nourished [Normal Appearance] : normal appearance [Well Groomed] : well groomed [] : no respiratory distress [Abdomen Soft] : soft [Penis Abnormality] : normal circumcised penis [Scrotum] : the scrotum was normal [Testes Tenderness] : no tenderness of the testes [FreeTextEntry1] : ambulates with rollator

## 2023-06-15 ENCOUNTER — NON-APPOINTMENT (OUTPATIENT)
Age: 88
End: 2023-06-15

## 2023-06-15 LAB
APPEARANCE: CLEAR
BACTERIA: NEGATIVE /HPF
BILIRUBIN URINE: NEGATIVE
BLOOD URINE: NEGATIVE
CAST: 0 /LPF
COLOR: YELLOW
EPITHELIAL CELLS: 1 /HPF
GLUCOSE QUALITATIVE U: NEGATIVE MG/DL
KETONES URINE: NEGATIVE MG/DL
LEUKOCYTE ESTERASE URINE: NEGATIVE
MICROSCOPIC-UA: NORMAL
NITRITE URINE: NEGATIVE
PH URINE: 6
PROTEIN URINE: NEGATIVE MG/DL
RED BLOOD CELLS URINE: 0 /HPF
SPECIFIC GRAVITY URINE: 1.01
UROBILINOGEN URINE: 0.2 MG/DL
WHITE BLOOD CELLS URINE: 0 /HPF

## 2023-07-17 ENCOUNTER — APPOINTMENT (OUTPATIENT)
Dept: UROLOGY | Facility: CLINIC | Age: 88
End: 2023-07-17
Payer: MEDICARE

## 2023-07-17 VITALS
HEIGHT: 69 IN | WEIGHT: 182 LBS | TEMPERATURE: 97.1 F | SYSTOLIC BLOOD PRESSURE: 132 MMHG | RESPIRATION RATE: 16 BRPM | BODY MASS INDEX: 26.96 KG/M2 | DIASTOLIC BLOOD PRESSURE: 69 MMHG | HEART RATE: 62 BPM | OXYGEN SATURATION: 98 %

## 2023-07-17 PROCEDURE — 51705 CHANGE OF BLADDER TUBE: CPT

## 2023-07-21 ENCOUNTER — NON-APPOINTMENT (OUTPATIENT)
Age: 88
End: 2023-07-21

## 2023-07-24 ENCOUNTER — NON-APPOINTMENT (OUTPATIENT)
Age: 88
End: 2023-07-24

## 2023-07-24 LAB — BACTERIA UR CULT: ABNORMAL

## 2023-09-07 ENCOUNTER — APPOINTMENT (OUTPATIENT)
Dept: UROLOGY | Facility: CLINIC | Age: 88
End: 2023-09-07

## 2023-09-07 ENCOUNTER — APPOINTMENT (OUTPATIENT)
Dept: UROLOGY | Facility: CLINIC | Age: 88
End: 2023-09-07
Payer: MEDICARE

## 2023-09-07 VITALS
BODY MASS INDEX: 26.96 KG/M2 | TEMPERATURE: 98.2 F | DIASTOLIC BLOOD PRESSURE: 63 MMHG | HEART RATE: 64 BPM | WEIGHT: 182 LBS | HEIGHT: 69 IN | SYSTOLIC BLOOD PRESSURE: 138 MMHG | OXYGEN SATURATION: 97 % | RESPIRATION RATE: 16 BRPM

## 2023-09-07 PROCEDURE — 51702 INSERT TEMP BLADDER CATH: CPT

## 2023-10-02 DIAGNOSIS — Z74.09 OTHER REDUCED MOBILITY: ICD-10-CM

## 2023-10-12 ENCOUNTER — APPOINTMENT (OUTPATIENT)
Dept: UROLOGY | Facility: CLINIC | Age: 88
End: 2023-10-12

## 2023-10-17 PROBLEM — Z78.9 OTHER SPECIFIED HEALTH STATUS: Chronic | Status: ACTIVE | Noted: 2023-03-24

## 2023-10-19 ENCOUNTER — APPOINTMENT (OUTPATIENT)
Dept: UROLOGY | Facility: CLINIC | Age: 88
End: 2023-10-19
Payer: MEDICARE

## 2023-10-19 VITALS
BODY MASS INDEX: 26.66 KG/M2 | RESPIRATION RATE: 16 BRPM | DIASTOLIC BLOOD PRESSURE: 74 MMHG | HEART RATE: 67 BPM | TEMPERATURE: 98 F | OXYGEN SATURATION: 96 % | SYSTOLIC BLOOD PRESSURE: 148 MMHG | HEIGHT: 69 IN | WEIGHT: 180 LBS

## 2023-10-19 PROCEDURE — 51702 INSERT TEMP BLADDER CATH: CPT

## 2023-11-17 ENCOUNTER — APPOINTMENT (OUTPATIENT)
Dept: UROLOGY | Facility: CLINIC | Age: 88
End: 2023-11-17
Payer: MEDICARE

## 2023-11-17 VITALS
RESPIRATION RATE: 16 BRPM | HEART RATE: 67 BPM | TEMPERATURE: 97.8 F | SYSTOLIC BLOOD PRESSURE: 134 MMHG | DIASTOLIC BLOOD PRESSURE: 72 MMHG | OXYGEN SATURATION: 95 %

## 2023-11-17 PROCEDURE — 51702 INSERT TEMP BLADDER CATH: CPT

## 2023-12-21 ENCOUNTER — APPOINTMENT (OUTPATIENT)
Dept: UROLOGY | Facility: CLINIC | Age: 88
End: 2023-12-21
Payer: MEDICARE

## 2023-12-21 VITALS
OXYGEN SATURATION: 98 % | RESPIRATION RATE: 16 BRPM | DIASTOLIC BLOOD PRESSURE: 70 MMHG | TEMPERATURE: 97.3 F | HEART RATE: 71 BPM | SYSTOLIC BLOOD PRESSURE: 146 MMHG

## 2023-12-21 PROCEDURE — 51702 INSERT TEMP BLADDER CATH: CPT

## 2024-02-08 ENCOUNTER — APPOINTMENT (OUTPATIENT)
Dept: UROLOGY | Facility: CLINIC | Age: 89
End: 2024-02-08
Payer: MEDICARE

## 2024-02-08 VITALS
SYSTOLIC BLOOD PRESSURE: 137 MMHG | WEIGHT: 180 LBS | BODY MASS INDEX: 26.66 KG/M2 | OXYGEN SATURATION: 97 % | TEMPERATURE: 97.3 F | DIASTOLIC BLOOD PRESSURE: 73 MMHG | RESPIRATION RATE: 16 BRPM | HEIGHT: 69 IN | HEART RATE: 69 BPM

## 2024-02-08 PROCEDURE — 51702 INSERT TEMP BLADDER CATH: CPT

## 2024-03-08 ENCOUNTER — APPOINTMENT (OUTPATIENT)
Dept: UROLOGY | Facility: CLINIC | Age: 89
End: 2024-03-08

## 2024-05-09 ENCOUNTER — APPOINTMENT (OUTPATIENT)
Dept: UROLOGY | Facility: CLINIC | Age: 89
End: 2024-05-09
Payer: MEDICARE

## 2024-05-09 VITALS
DIASTOLIC BLOOD PRESSURE: 70 MMHG | SYSTOLIC BLOOD PRESSURE: 134 MMHG | HEART RATE: 63 BPM | OXYGEN SATURATION: 97 % | TEMPERATURE: 97.8 F | RESPIRATION RATE: 16 BRPM

## 2024-05-09 PROCEDURE — 51702 INSERT TEMP BLADDER CATH: CPT

## 2024-06-06 ENCOUNTER — APPOINTMENT (OUTPATIENT)
Dept: UROLOGY | Facility: CLINIC | Age: 89
End: 2024-06-06

## 2024-06-06 ENCOUNTER — APPOINTMENT (OUTPATIENT)
Dept: UROLOGY | Facility: CLINIC | Age: 89
End: 2024-06-06
Payer: MEDICARE

## 2024-06-06 VITALS
HEART RATE: 55 BPM | SYSTOLIC BLOOD PRESSURE: 164 MMHG | OXYGEN SATURATION: 96 % | TEMPERATURE: 98.1 F | DIASTOLIC BLOOD PRESSURE: 75 MMHG

## 2024-06-06 DIAGNOSIS — N40.0 BENIGN PROSTATIC HYPERPLASIA WITHOUT LOWER URINARY TRACT SYMPMS: ICD-10-CM

## 2024-06-06 DIAGNOSIS — R33.9 RETENTION OF URINE, UNSPECIFIED: ICD-10-CM

## 2024-06-06 PROCEDURE — 51702 INSERT TEMP BLADDER CATH: CPT

## 2024-07-03 ENCOUNTER — APPOINTMENT (OUTPATIENT)
Age: 89
End: 2024-07-03
Payer: MEDICARE

## 2024-07-03 VITALS
DIASTOLIC BLOOD PRESSURE: 68 MMHG | RESPIRATION RATE: 16 BRPM | HEART RATE: 58 BPM | TEMPERATURE: 97.6 F | OXYGEN SATURATION: 98 % | SYSTOLIC BLOOD PRESSURE: 144 MMHG

## 2024-07-03 DIAGNOSIS — R33.9 RETENTION OF URINE, UNSPECIFIED: ICD-10-CM

## 2024-07-03 PROCEDURE — 51702 INSERT TEMP BLADDER CATH: CPT

## 2024-08-08 ENCOUNTER — APPOINTMENT (OUTPATIENT)
Dept: UROLOGY | Facility: CLINIC | Age: 89
End: 2024-08-08

## 2024-08-08 PROCEDURE — 51702 INSERT TEMP BLADDER CATH: CPT

## 2024-09-12 ENCOUNTER — APPOINTMENT (OUTPATIENT)
Dept: UROLOGY | Facility: CLINIC | Age: 89
End: 2024-09-12
Payer: MEDICARE

## 2024-09-12 VITALS
HEART RATE: 67 BPM | DIASTOLIC BLOOD PRESSURE: 71 MMHG | OXYGEN SATURATION: 100 % | RESPIRATION RATE: 16 BRPM | SYSTOLIC BLOOD PRESSURE: 162 MMHG | TEMPERATURE: 97.3 F

## 2024-09-12 DIAGNOSIS — R33.9 RETENTION OF URINE, UNSPECIFIED: ICD-10-CM

## 2024-09-12 PROCEDURE — 51702 INSERT TEMP BLADDER CATH: CPT

## 2024-11-07 ENCOUNTER — APPOINTMENT (OUTPATIENT)
Dept: UROLOGY | Facility: CLINIC | Age: 89
End: 2024-11-07
Payer: MEDICARE

## 2024-11-07 VITALS
TEMPERATURE: 98.3 F | HEART RATE: 67 BPM | OXYGEN SATURATION: 98 % | RESPIRATION RATE: 16 BRPM | DIASTOLIC BLOOD PRESSURE: 80 MMHG | SYSTOLIC BLOOD PRESSURE: 122 MMHG

## 2024-11-07 VITALS
OXYGEN SATURATION: 96 % | DIASTOLIC BLOOD PRESSURE: 69 MMHG | SYSTOLIC BLOOD PRESSURE: 173 MMHG | HEART RATE: 68 BPM | TEMPERATURE: 97.9 F

## 2024-11-07 DIAGNOSIS — N40.0 BENIGN PROSTATIC HYPERPLASIA WITHOUT LOWER URINARY TRACT SYMPMS: ICD-10-CM

## 2024-11-07 DIAGNOSIS — R33.9 RETENTION OF URINE, UNSPECIFIED: ICD-10-CM

## 2024-11-07 PROCEDURE — 51702 INSERT TEMP BLADDER CATH: CPT

## 2024-12-19 ENCOUNTER — APPOINTMENT (OUTPATIENT)
Dept: UROLOGY | Facility: CLINIC | Age: 88
End: 2024-12-19
Payer: MEDICARE

## 2024-12-19 VITALS
TEMPERATURE: 97.3 F | OXYGEN SATURATION: 94 % | SYSTOLIC BLOOD PRESSURE: 153 MMHG | HEART RATE: 66 BPM | DIASTOLIC BLOOD PRESSURE: 65 MMHG | RESPIRATION RATE: 16 BRPM

## 2024-12-19 DIAGNOSIS — R33.9 RETENTION OF URINE, UNSPECIFIED: ICD-10-CM

## 2024-12-19 DIAGNOSIS — N40.0 BENIGN PROSTATIC HYPERPLASIA WITHOUT LOWER URINARY TRACT SYMPMS: ICD-10-CM

## 2024-12-19 PROCEDURE — 51702 INSERT TEMP BLADDER CATH: CPT

## 2025-01-17 ENCOUNTER — APPOINTMENT (OUTPATIENT)
Dept: UROLOGY | Facility: CLINIC | Age: 89
End: 2025-01-17
Payer: MEDICARE

## 2025-01-17 VITALS
DIASTOLIC BLOOD PRESSURE: 75 MMHG | TEMPERATURE: 97.7 F | RESPIRATION RATE: 16 BRPM | HEART RATE: 58 BPM | SYSTOLIC BLOOD PRESSURE: 188 MMHG | OXYGEN SATURATION: 96 %

## 2025-01-17 DIAGNOSIS — R33.9 RETENTION OF URINE, UNSPECIFIED: ICD-10-CM

## 2025-01-17 DIAGNOSIS — N40.0 BENIGN PROSTATIC HYPERPLASIA WITHOUT LOWER URINARY TRACT SYMPMS: ICD-10-CM

## 2025-01-17 PROCEDURE — 51702 INSERT TEMP BLADDER CATH: CPT

## 2025-03-07 ENCOUNTER — APPOINTMENT (OUTPATIENT)
Dept: UROLOGY | Facility: CLINIC | Age: 89
End: 2025-03-07
Payer: MEDICARE

## 2025-03-07 VITALS
WEIGHT: 193.38 LBS | HEART RATE: 70 BPM | OXYGEN SATURATION: 94 % | TEMPERATURE: 97 F | BODY MASS INDEX: 28.64 KG/M2 | DIASTOLIC BLOOD PRESSURE: 68 MMHG | RESPIRATION RATE: 16 BRPM | HEIGHT: 69 IN | SYSTOLIC BLOOD PRESSURE: 152 MMHG

## 2025-03-07 DIAGNOSIS — R33.9 RETENTION OF URINE, UNSPECIFIED: ICD-10-CM

## 2025-03-07 DIAGNOSIS — N40.0 BENIGN PROSTATIC HYPERPLASIA WITHOUT LOWER URINARY TRACT SYMPMS: ICD-10-CM

## 2025-03-07 PROCEDURE — 51702 INSERT TEMP BLADDER CATH: CPT
